# Patient Record
Sex: FEMALE | Race: WHITE | Employment: UNEMPLOYED | URBAN - METROPOLITAN AREA
[De-identification: names, ages, dates, MRNs, and addresses within clinical notes are randomized per-mention and may not be internally consistent; named-entity substitution may affect disease eponyms.]

---

## 2020-04-11 ENCOUNTER — HOSPITAL ENCOUNTER (EMERGENCY)
Age: 20
Discharge: HOME | End: 2020-04-11
Attending: EMERGENCY MEDICINE
Payer: COMMERCIAL

## 2020-04-11 VITALS
HEART RATE: 74 BPM | SYSTOLIC BLOOD PRESSURE: 126 MMHG | WEIGHT: 122 LBS | BODY MASS INDEX: 22.45 KG/M2 | TEMPERATURE: 99.8 F | DIASTOLIC BLOOD PRESSURE: 75 MMHG | OXYGEN SATURATION: 99 % | RESPIRATION RATE: 17 BRPM | HEIGHT: 62 IN

## 2020-04-11 LAB — HCG(URINE) PREGNANCY TEST: NEGATIVE

## 2020-04-11 PROCEDURE — 99283 EMERGENCY DEPT VISIT LOW MDM: CPT

## 2020-04-11 PROCEDURE — 84703 CHORIONIC GONADOTROPIN ASSAY: CPT

## 2020-04-11 RX ORDER — CITALOPRAM 10 MG/1
10 TABLET ORAL DAILY
Qty: 90 TABLET | Refills: 1 | Status: SHIPPED | OUTPATIENT
Start: 2020-04-11

## 2020-04-11 NOTE — ED PROVIDER NOTES
Emergency Department Encounter    Patient: Varinder Shafer  MRN: [id number]  : 2000  Date of Evaluation: 2020  ED Provider:  Violteta Rojo    Triage Chief Complaint:   Suicidal (states she has a lot going on at home, feels \"sad\" all the time. no specific plan but states she wants to hurt herself. )    [place]:  Varinder Shafer is a 20 y.o. female that presents for evaluation of increasing depression and anxiety, she states that she want some help she states that she is not actively suicidal homicidal she states she has no specific plan to harm herself, that triage note states this however she actually can completely denies this statement whatsoever. She has no hallucination she has had a history of depression in the past, she states she has multiple social stressors cannot get a job and has had multiple feelings of worthlessness is that she cannot do anything right. She is currently not on any medications. No fevers or Reiger's. No active chest pain. She was also complaining of a possible left breast mass. Her father  of cancer. No other family history for breast cancer or ovarian CA. Currently menstruating. Denies pregnancy. Last intercourse was within the last several days. ROS - see HPI, below listed is current ROS at time of my eval:  General:  No fevers  Eyes:  No recent vison changes  ENT:  No sore throat, no nasal congestion  Cardiovascular:  No chest pain, no palpitations  Respiratory:  No shortness of breath  Gastrointestinal:  No pain, no nausea, no vomiting, no diarrhea  Musculoskeletal:  No muscle pain  Skin:  No rash  Neurologic:  no headache  Psychiatric:  No anxiety, + depression  Genitourinary:  No dysuria  Endocrine:  No unexpected weight gain, no unexpected weight loss  Extremities:  no edema, no pain    History reviewed. No pertinent past medical history. History reviewed. No pertinent surgical history. History reviewed. No pertinent family history.   Social History

## 2021-12-27 ENCOUNTER — OFFICE VISIT (OUTPATIENT)
Dept: URGENT CARE | Age: 21
End: 2021-12-27
Payer: COMMERCIAL

## 2021-12-27 VITALS
BODY MASS INDEX: 25.95 KG/M2 | RESPIRATION RATE: 24 BRPM | TEMPERATURE: 98.6 F | DIASTOLIC BLOOD PRESSURE: 64 MMHG | HEART RATE: 80 BPM | HEIGHT: 62 IN | OXYGEN SATURATION: 98 % | SYSTOLIC BLOOD PRESSURE: 100 MMHG | WEIGHT: 141 LBS

## 2021-12-27 DIAGNOSIS — J02.0 STREP THROAT: ICD-10-CM

## 2021-12-27 DIAGNOSIS — J02.9 SORE THROAT: Primary | ICD-10-CM

## 2021-12-27 LAB — S PYO AG THROAT QL: POSITIVE

## 2021-12-27 PROCEDURE — G8427 DOCREV CUR MEDS BY ELIG CLIN: HCPCS

## 2021-12-27 PROCEDURE — 87880 STREP A ASSAY W/OPTIC: CPT

## 2021-12-27 PROCEDURE — 4004F PT TOBACCO SCREEN RCVD TLK: CPT

## 2021-12-27 PROCEDURE — 99202 OFFICE O/P NEW SF 15 MIN: CPT

## 2021-12-27 PROCEDURE — G8419 CALC BMI OUT NRM PARAM NOF/U: HCPCS

## 2021-12-27 PROCEDURE — G8484 FLU IMMUNIZE NO ADMIN: HCPCS

## 2021-12-27 RX ORDER — AZITHROMYCIN 250 MG/1
250 TABLET, FILM COATED ORAL DAILY
Qty: 6 TABLET | Refills: 0 | Status: SHIPPED | OUTPATIENT
Start: 2021-12-27 | End: 2022-01-01

## 2021-12-27 ASSESSMENT — ENCOUNTER SYMPTOMS
VOMITING: 0
CHEST TIGHTNESS: 0
NAUSEA: 0
RHINORRHEA: 0
SINUS PRESSURE: 0
SHORTNESS OF BREATH: 0
SINUS PAIN: 0
DIARRHEA: 0
SORE THROAT: 1

## 2021-12-27 NOTE — LETTER
[facility]  [facility] [address]  Phone: [phone]  Fax: [phone]    RAMIN Morales CNP        December 27, 2021     Patient: Siri Medina   YOB: 2000   Date of Visit: 12/27/2021       To Whom It May Concern: It is my medical opinion that Siri Medina may return to work on 12/29/21. If you have any questions or concerns, please don't hesitate to call.     Sincerely,        RAMIN Morales CNP

## 2021-12-28 NOTE — PATIENT INSTRUCTIONS
Strep swab in office today---positive  Strep throat instructions given. Take medications as prescribed. Discussed medication side effects. Obtain rest.  Drink fluids (water, tea, broth) but avoid alcohol, soft drinks, acidic items. Gargle daily with salt water (1/2 teaspoon in a glass of water). Avoid smoke and other irritants. Take OTC pain relievers as needed. Discussed Cepacol lozenges, Chloroseptic Spray, and hot tea with honey for symptom relief. Replace toothbrush and do not share utensils, cups, etc with others. Wash hands often with soap and water. Discussed precautions and indications for returning to clinic. Discussed precautions and indications for seeking ED care.

## 2021-12-28 NOTE — PROGRESS NOTES
Alena Dash (: 2000) is a 24 y.o. female here for evaluation of the following chief complaint(s):  Pharyngitis      SUBJECTIVE:  HPI  Pt presents today with c/o sore throat that began 3-4 days ago and was accompanied by a fever. She denies any exposure to covid and states tylenol has been helpful with her symptoms. Review of Systems   Constitutional: Positive for activity change, fatigue and fever. HENT: Positive for sore throat. Negative for congestion, ear pain, rhinorrhea, sinus pressure and sinus pain. Respiratory: Negative for chest tightness and shortness of breath. Cardiovascular: Negative for chest pain and palpitations. Gastrointestinal: Negative for diarrhea, nausea and vomiting. Musculoskeletal: Negative for arthralgias and myalgias. Neurological: Negative for dizziness and headaches. OBJECTIVE:  /64   Pulse 80   Temp 98.6 °F (37 °C)   Resp 24   Ht 5' 2\" (1.575 m)   Wt 141 lb (64 kg)   SpO2 98%   BMI 25.79 kg/m²    Physical Exam  Vitals reviewed. Constitutional:       Appearance: She is normal weight. She is ill-appearing. HENT:      Head: Normocephalic. Right Ear: Tympanic membrane normal.      Left Ear: Tympanic membrane normal.      Nose: Congestion present. Mouth/Throat:      Mouth: Mucous membranes are moist.      Pharynx: Oropharyngeal exudate and posterior oropharyngeal erythema present. Eyes:      Extraocular Movements: Extraocular movements intact. Conjunctiva/sclera: Conjunctivae normal.      Pupils: Pupils are equal, round, and reactive to light. Cardiovascular:      Rate and Rhythm: Normal rate and regular rhythm. Pulses: Normal pulses. Heart sounds: Normal heart sounds. Pulmonary:      Effort: Pulmonary effort is normal.      Breath sounds: Normal breath sounds. Abdominal:      General: Abdomen is flat. Bowel sounds are normal.   Musculoskeletal:         General: Normal range of motion.       Cervical back: Normal range of motion. Lymphadenopathy:      Cervical: Cervical adenopathy present. Skin:     General: Skin is warm and dry. Capillary Refill: Capillary refill takes less than 2 seconds. Neurological:      General: No focal deficit present. Mental Status: She is alert and oriented to person, place, and time. Mental status is at baseline. Psychiatric:         Mood and Affect: Mood normal.         Behavior: Behavior normal.         Thought Content: Thought content normal.         Judgment: Judgment normal.         ASSESSMENT:  1. Sore throat  -     POCT rapid strep A  2. Strep throat  -     azithromycin (ZITHROMAX) 250 MG tablet; Take 1 tablet by mouth daily for 5 days Take 2 tablets today and then take 1 tablet each day starting tomorrow until gone, Disp-6 tablet, R-0Normal      PLAN:  Strep swab in office today---positive  Pharyngitis/Strep throat instructions given. Take medications as prescribed. Discussed medication side effects. Obtain rest.  Drink fluids (water, tea, broth) but avoid alcohol, soft drinks, acidic items. Gargle daily with salt water (1/2 teaspoon in a glass of water). Avoid smoke and other irritants. Take OTC pain relievers as needed. Discussed Cepacol lozenges, Chloroseptic Spray, and hot tea with honey for symptom relief. Replace toothbrush and do not share utensils, cups, etc with others. Wash hands often with soap and water. Discussed precautions and indications for returning to clinic. Discussed precautions and indications for seeking ED care. Return if symptoms worsen or fail to improve.      Electronically signed by RAMIN Parr CNP on 12/27/2021 at 8:12 PM.

## 2022-01-11 ENCOUNTER — OFFICE VISIT (OUTPATIENT)
Dept: URGENT CARE | Age: 22
End: 2022-01-11
Payer: COMMERCIAL

## 2022-01-11 VITALS
BODY MASS INDEX: 22.29 KG/M2 | SYSTOLIC BLOOD PRESSURE: 110 MMHG | RESPIRATION RATE: 12 BRPM | OXYGEN SATURATION: 100 % | DIASTOLIC BLOOD PRESSURE: 70 MMHG | TEMPERATURE: 98.6 F | HEART RATE: 78 BPM | HEIGHT: 67 IN | WEIGHT: 142 LBS

## 2022-01-11 DIAGNOSIS — J02.9 PHARYNGITIS, UNSPECIFIED ETIOLOGY: ICD-10-CM

## 2022-01-11 DIAGNOSIS — B34.9 VIRAL ILLNESS: ICD-10-CM

## 2022-01-11 DIAGNOSIS — R50.81 FEVER IN OTHER DISEASES: Primary | ICD-10-CM

## 2022-01-11 LAB
Lab: NORMAL
PERFORMING INSTRUMENT: NORMAL
QC PASS/FAIL: NORMAL
SARS-COV-2, POC: NORMAL

## 2022-01-11 PROCEDURE — G8484 FLU IMMUNIZE NO ADMIN: HCPCS

## 2022-01-11 PROCEDURE — 87426 SARSCOV CORONAVIRUS AG IA: CPT

## 2022-01-11 PROCEDURE — 4004F PT TOBACCO SCREEN RCVD TLK: CPT

## 2022-01-11 PROCEDURE — G8420 CALC BMI NORM PARAMETERS: HCPCS

## 2022-01-11 PROCEDURE — 99212 OFFICE O/P EST SF 10 MIN: CPT

## 2022-01-11 PROCEDURE — G8427 DOCREV CUR MEDS BY ELIG CLIN: HCPCS

## 2022-01-11 ASSESSMENT — ENCOUNTER SYMPTOMS
SORE THROAT: 1
CHEST TIGHTNESS: 0
SINUS PRESSURE: 0
VOMITING: 0
SINUS PAIN: 0
DIARRHEA: 0
NAUSEA: 0
SHORTNESS OF BREATH: 0
COUGH: 0

## 2022-01-11 NOTE — LETTER
NOTIFICATION RETURN TO WORK / SCHOOL    1/11/2022    Ms. Stevie Givens  [address] [place] [address]      To Whom It May Concern:    Stevie Givens was tested for COVID-19 on 1/11/22, and the result was negative. She may return to work on 1/13 /22 as long as fever free and symptoms have improved. I recommend:return without restrictions    If there are questions or concerns, please have the patient contact our office.         Sincerely,      RAMIN Mendoza - CNP

## 2022-01-12 NOTE — PROGRESS NOTES
Amaris Acevedo (: 2000) is a 24 y.o. female here for evaluation of the following chief complaint(s):  Covid Testing      SUBJECTIVE:  HPI   Pt presents today with c/o sore throat, fatigue, congestion, and myalgias that began 3 days ago. She states OTC medications have been minimally helpful. She also states she had a recent positive exposure to covid. Review of Systems   Constitutional: Positive for fatigue. Negative for fever. HENT: Positive for sore throat. Negative for congestion, ear pain, sinus pressure and sinus pain. Respiratory: Negative for cough, chest tightness and shortness of breath. Cardiovascular: Negative for chest pain and palpitations. Gastrointestinal: Negative for diarrhea, nausea and vomiting. Musculoskeletal: Positive for myalgias. Negative for arthralgias. Neurological: Positive for headaches. OBJECTIVE:  /70   Pulse 78   Temp 98.6 °F (37 °C)   Resp 12   Ht 5' 7\" (1.702 m)   Wt 142 lb (64.4 kg)   LMP 2021   SpO2 100%   BMI 22.24 kg/m²    Physical Exam  Constitutional:       Appearance: Normal appearance. She is normal weight. HENT:      Right Ear: Tympanic membrane normal.      Left Ear: Tympanic membrane normal.      Nose: Congestion present. Mouth/Throat:      Mouth: Mucous membranes are dry. Pharynx: Oropharynx is clear. Posterior oropharyngeal erythema present. Eyes:      Conjunctiva/sclera: Conjunctivae normal.      Pupils: Pupils are equal, round, and reactive to light. Cardiovascular:      Rate and Rhythm: Normal rate and regular rhythm. Pulses: Normal pulses. Heart sounds: Normal heart sounds. Pulmonary:      Effort: Pulmonary effort is normal.   Abdominal:      General: Abdomen is flat. Bowel sounds are normal.      Palpations: Abdomen is soft. Musculoskeletal:         General: Normal range of motion. Cervical back: Normal range of motion and neck supple. Skin:     General: Skin is warm and dry. Capillary Refill: Capillary refill takes less than 2 seconds. Neurological:      General: No focal deficit present. Mental Status: She is alert and oriented to person, place, and time. Psychiatric:         Mood and Affect: Mood normal.         Behavior: Behavior normal.         Thought Content: Thought content normal.         Judgment: Judgment normal.         ASSESSMENT:  1. Fever in other diseases  -     POCT COVID-19, Antigen  2. Viral illness  3. Pharyngitis, unspecified etiology      PLAN:  COVID swab collected in office today---negative  Take medications as prescribed  Obtain rest.  Maintain hydration. Wash hands often with soap and water. Avoid smoke and other irritants. Wear face covering in public and follow social distancing recommendations. Discussed precautions and indications for returning to clinic. Discussed precautions and indications for seeking ED care. Return if symptoms worsen or fail to improve.      Electronically signed by RAMIN Reyes CNP on 1/11/2022 at 7:56 PM.

## 2022-01-12 NOTE — PATIENT INSTRUCTIONS
COVID swab collected in office today---negative  Patient declined prescribed medications. OTC medications to treat symptoms. Obtain rest.  Maintain hydration. Wash hands often with soap and water. Avoid smoke and other irritants. Wear face covering in public and follow social distancing recommendations. Discussed precautions and indications for returning to clinic. Discussed precautions and indications for seeking ED care. Patient Education        Viral Infections: Care Instructions  Overview     You don't feel well, but it's not clear what's causing it. You may have a viral infection. Viruses cause many illnesses, such as the common cold, influenza, fever, rashes, and the diarrhea, nausea, and vomiting that are symptoms of a stomach infection. You may wonder if antibiotic medicines could make you feel better. But antibiotics only treat infections caused by bacteria. They don't work on viruses. The good news is that viral infections usually aren't serious. Most will go away in a few days without medical treatment. In the meantime, there are a few things you can do to make yourself more comfortable. Follow-up care is a key part of your treatment and safety. Be sure to make and go to all appointments, and call your doctor if you are having problems. It's also a good idea to know your test results and keep a list of the medicines you take. How can you care for yourself at home? · Get plenty of rest if you feel tired. · Take an over-the-counter pain medicine if needed, such as acetaminophen (Tylenol), ibuprofen (Advil, Motrin), or naproxen (Aleve). Read and follow all instructions on the label. · Be careful when taking over-the-counter cold or flu medicines and Tylenol at the same time. Many of these medicines have acetaminophen, which is Tylenol. Read the labels to make sure that you are not taking more than the recommended dose. Too much acetaminophen (Tylenol) can be harmful.   · Drink plenty of fluids. If you have kidney, heart, or liver disease and have to limit fluids, talk with your doctor before you increase the amount of fluids you drink. · Stay home from work, school, and other public places while you have a fever. When should you call for help? Call 911 anytime you think you may need emergency care. For example, call if:    · You have severe trouble breathing.     · You passed out (lost consciousness). Call your doctor now or seek immediate medical care if:    · You seem to be getting much sicker.     · You have a new or higher fever.     · You have blood in your stools.     · You have new belly pain, or your pain gets worse.     · You have a new rash. Watch closely for changes in your health, and be sure to contact your doctor if:    · You start to get better and then get worse.     · You do not get better as expected. Where can you learn more? Go to https://Didi-Dache.[employer]. org and sign in to your [employer] account. Enter M131 in the [employer]house box to learn more about \"Viral Infections: Care Instructions. \"     If you do not have an account, please click on the \"Sign Up Now\" link. Current as of: July 1, 2021               Content Version: 13.1  © 2006-2021 [employer]. Care instructions adapted under license by [facility] ([facility]). If you have questions about a medical condition or this instruction, always ask your healthcare professional. [place] [address] any warranty or liability for your use of this information. Patient Education        Sore Throat: Care Instructions  Your Care Instructions     Infection by bacteria or a virus causes most sore throats. Cigarette smoke, dry air, air pollution, allergies, and yelling can also cause a sore throat. Sore throats can be painful and annoying. Fortunately, most sore throats go away on their own. If you have a bacterial infection, your doctor may prescribe antibiotics.   Follow-up care is a key part of your treatment and safety. Be sure to make and go to all appointments, and call your doctor if you are having problems. It's also a good idea to know your test results and keep a list of the medicines you take. How can you care for yourself at home? · If your doctor prescribed antibiotics, take them as directed. Do not stop taking them just because you feel better. You need to take the full course of antibiotics. · Gargle with warm salt water once an hour to help reduce swelling and relieve discomfort. Use 1 teaspoon of salt mixed in 1 cup of warm water. · Take an over-the-counter pain medicine, such as acetaminophen (Tylenol), ibuprofen (Advil, Motrin), or naproxen (Aleve). Read and follow all instructions on the label. · Be careful when taking over-the-counter cold or flu medicines and Tylenol at the same time. Many of these medicines have acetaminophen, which is Tylenol. Read the labels to make sure that you are not taking more than the recommended dose. Too much acetaminophen (Tylenol) can be harmful. · Drink plenty of fluids. Fluids may help soothe an irritated throat. Hot fluids, such as tea or soup, may help decrease throat pain. · Use over-the-counter throat lozenges to soothe pain. Regular cough drops or hard candy may also help. These should not be given to young children because of the risk of choking. · Do not smoke or allow others to smoke around you. If you need help quitting, talk to your doctor about stop-smoking programs and medicines. These can increase your chances of quitting for good. · Use a vaporizer or humidifier to add moisture to your bedroom. Follow the directions for cleaning the machine. When should you call for help? Call your doctor now or seek immediate medical care if:    · You have new or worse trouble swallowing.     · Your sore throat gets much worse on one side.    Watch closely for changes in your health, and be sure to contact your doctor if you do not get better as expected. Where can you learn more? Go to https://chpepiceweb.Infratel. org and sign in to your [employer] account. Enter T491 in the [facility] box to learn more about \"Sore Throat: Care Instructions. \"     If you do not have an account, please click on the \"Sign Up Now\" link. Current as of: September 8, 2021               Content Version: 13.1  © 2006-2021 Healthwise, Incorporated. Care instructions adapted under license by [facility] ([facility]). If you have questions about a medical condition or this instruction, always ask your healthcare professional. [address] any warranty or liability for your use of this information.

## 2022-02-02 ENCOUNTER — OFFICE VISIT (OUTPATIENT)
Dept: URGENT CARE | Age: 22
End: 2022-02-02
Payer: COMMERCIAL

## 2022-02-02 VITALS
HEIGHT: 62 IN | WEIGHT: 142 LBS | DIASTOLIC BLOOD PRESSURE: 59 MMHG | TEMPERATURE: 97.5 F | BODY MASS INDEX: 26.13 KG/M2 | SYSTOLIC BLOOD PRESSURE: 99 MMHG | OXYGEN SATURATION: 98 % | HEART RATE: 73 BPM

## 2022-02-02 DIAGNOSIS — T78.40XA ALLERGIC REACTION, INITIAL ENCOUNTER: Primary | ICD-10-CM

## 2022-02-02 DIAGNOSIS — L50.9 HIVES: ICD-10-CM

## 2022-02-02 PROCEDURE — 96372 THER/PROPH/DIAG INJ SC/IM: CPT | Performed by: NURSE PRACTITIONER

## 2022-02-02 PROCEDURE — 99202 OFFICE O/P NEW SF 15 MIN: CPT | Performed by: NURSE PRACTITIONER

## 2022-02-02 RX ORDER — DEXAMETHASONE SODIUM PHOSPHATE 4 MG/ML
8 INJECTION, SOLUTION INTRA-ARTICULAR; INTRALESIONAL; INTRAMUSCULAR; INTRAVENOUS; SOFT TISSUE ONCE
Status: COMPLETED | OUTPATIENT
Start: 2022-02-02 | End: 2022-02-02

## 2022-02-02 RX ORDER — DEXAMETHASONE SODIUM PHOSPHATE 4 MG/ML
4 INJECTION, SOLUTION INTRA-ARTICULAR; INTRALESIONAL; INTRAMUSCULAR; INTRAVENOUS; SOFT TISSUE ONCE
Status: DISCONTINUED | OUTPATIENT
Start: 2022-02-02 | End: 2022-02-02

## 2022-02-02 RX ADMIN — DEXAMETHASONE SODIUM PHOSPHATE 8 MG: 4 INJECTION, SOLUTION INTRA-ARTICULAR; INTRALESIONAL; INTRAMUSCULAR; INTRAVENOUS; SOFT TISSUE at 18:00

## 2022-02-02 ASSESSMENT — ENCOUNTER SYMPTOMS
ABDOMINAL PAIN: 0
SHORTNESS OF BREATH: 0
COLOR CHANGE: 0
FACIAL SWELLING: 0
BACK PAIN: 0
ABDOMINAL DISTENTION: 0
CONSTIPATION: 0
DIARRHEA: 0
COUGH: 0
WHEEZING: 0
SINUS PRESSURE: 0
NAUSEA: 0
CHEST TIGHTNESS: 0
SINUS PAIN: 0
VOMITING: 0

## 2022-02-02 NOTE — PROGRESS NOTES
Amanda Howard (:  2000) is a 24 y.o. female,New patient, here for evaluation of the following chief complaint(s):  Pruritis (Hands and feet ) and Rash         ASSESSMENT/PLAN:  1. Allergic reaction, initial encounter  -     dexamethasone (DECADRON) injection 8 mg; 8 mg, IntraMUSCular, ONCE, On 22 at 1815, For 1 dose  -     Ambulatory referral to Dermatology  2. Hives  -     dexamethasone (DECADRON) injection 8 mg; 8 mg, IntraMUSCular, ONCE, On 22 at 1815, For 1 dose  -     Ambulatory referral to Dermatology      Return if symptoms worsen or fail to improve, for FOLLOW UP WITH PCP. Subjective   SUBJECTIVE/OBJECTIVE:  Vitals:    22 1707   BP: (!) 99/59   Pulse: 73   Temp: 97.5 °F (36.4 °C)   SpO2: 98%       SARS-COV-2, POC (no units)   Date Value   2022 Not-Detected     Strep A Ag (no units)   Date Value   2021 Positive (A)       HPI  Patient came with complaints of an allergic reaction to , food. Reports the incident happened today after eating [employer] bread. a sandwich with nuts in it. The reaction happened after 30 minutes and felt itchy and red. In urgent care, noted a  mild rash, hives, and itching or stomach cramps. Denies swelling of her tongue and throat and denies SOB or chest pain. No wheezing. Angio edema, slurred speech, chills or dizziness. Review of Systems   Constitutional: Negative for appetite change, chills, fatigue, fever and unexpected weight change. HENT: Negative for congestion, ear pain, facial swelling, sinus pressure and sinus pain. Respiratory: Negative for cough, chest tightness, shortness of breath and wheezing. Cardiovascular: Negative for chest pain, palpitations and leg swelling. Gastrointestinal: Negative for abdominal distention, abdominal pain, constipation, diarrhea, nausea and vomiting. Genitourinary: Negative for decreased urine volume, difficulty urinating, dysuria, flank pain, frequency and urgency. Musculoskeletal: Negative for back pain, joint swelling, neck pain and neck stiffness. Skin: Positive for rash. Negative for color change. Hives after allergic reaction to eating [employer] sandwich. Allergic/Immunologic: Negative for environmental allergies and food allergies. Neurological: Negative for dizziness, syncope, weakness and headaches. Psychiatric/Behavioral: Negative for agitation and confusion. Objective     Vitals:    02/02/22 1707   BP: (!) 99/59   Pulse: 73   Temp: 97.5 °F (36.4 °C)   SpO2: 98%       Physical Exam  Vitals reviewed. Constitutional:       Appearance: Normal appearance. HENT:      Head: Normocephalic. Right Ear: Tympanic membrane and ear canal normal.      Left Ear: Tympanic membrane and ear canal normal.      Nose: No congestion or rhinorrhea. Mouth/Throat:      Mouth: Mucous membranes are dry. Pharynx: Oropharynx is clear. Eyes:      Pupils: Pupils are equal, round, and reactive to light. Cardiovascular:      Rate and Rhythm: Normal rate and regular rhythm. Pulmonary:      Effort: Pulmonary effort is normal. No respiratory distress. Breath sounds: Normal breath sounds. No stridor. No wheezing, rhonchi or rales. Chest:      Chest wall: No tenderness. Abdominal:      General: Bowel sounds are normal. There is no distension. Palpations: Abdomen is soft. Tenderness: There is no abdominal tenderness. Musculoskeletal:         General: No swelling or tenderness. Right lower leg: No edema. Left lower leg: No edema. Skin:     General: Skin is warm and dry. Coloration: Skin is not jaundiced or pale. Findings: No lesion or rash. Neurological:      Mental Status: She is alert and oriented to person, place, and time. Mental status is at baseline. Motor: No weakness.    Psychiatric:         Behavior: Behavior normal.              An electronic signature was used to authenticate this note.    --Yolande Winchester, APRN - CNP

## 2022-02-02 NOTE — PATIENT INSTRUCTIONS
are feeling better. Symptoms can come back after a shot. · Wear medical alert jewelry that lists your allergies. You can buy this at most drugstores. · If your child has a severe allergy, make sure that his or her teachers, babysitters, coaches, and other caregivers know about the allergy. They should have an epinephrine shot, know how and when to give it, and have a plan to take your child to the hospital.  When should you call for help? Give an epinephrine shot if:    · You think you are having a severe allergic reaction.     · You have symptoms in more than one body area, such as mild nausea and an itchy mouth. After giving an epinephrine shot call 911, even if you feel better. Call 911 if:    · You have symptoms of a severe allergic reaction. These may include:  ? Sudden raised, red areas (hives) all over your body. ? Swelling of the throat, mouth, lips, or tongue. ? Trouble breathing. ? Passing out (losing consciousness). Or you may feel very lightheaded or suddenly feel weak, confused, or restless.     · You have been given an epinephrine shot, even if you feel better. Call your doctor now or seek immediate medical care if:    · You have symptoms of an allergic reaction, such as:  ? A rash or hives (raised, red areas on the skin). ? Itching. ? Swelling. ? Belly pain, nausea, or vomiting. Watch closely for changes in your health, and be sure to contact your doctor if:    · You do not get better as expected. Where can you learn more? Go to https://[employer] Management.[employer]. org and sign in to your Workspace account. Enter N225 in the Vite box to learn more about \"Allergic Reaction: Care Instructions. \"     If you do not have an account, please click on the \"Sign Up Now\" link. Current as of: February 10, 2021               Content Version: 13.1  © 8910-8342 Healthwise, Incorporated. Care instructions adapted under license by [facility] ([facility]).  If you have questions about a medical condition or this instruction, always ask your healthcare professional. [place] [address] any warranty or liability for your use of this information.

## 2022-02-20 ENCOUNTER — OFFICE VISIT (OUTPATIENT)
Dept: URGENT CARE | Age: 22
End: 2022-02-20
Payer: COMMERCIAL

## 2022-02-20 VITALS
HEIGHT: 63 IN | OXYGEN SATURATION: 99 % | DIASTOLIC BLOOD PRESSURE: 71 MMHG | SYSTOLIC BLOOD PRESSURE: 102 MMHG | HEART RATE: 101 BPM | WEIGHT: 140 LBS | BODY MASS INDEX: 24.8 KG/M2 | TEMPERATURE: 98 F

## 2022-02-20 DIAGNOSIS — J02.9 PHARYNGITIS, UNSPECIFIED ETIOLOGY: ICD-10-CM

## 2022-02-20 DIAGNOSIS — J02.9 SORE THROAT: Primary | ICD-10-CM

## 2022-02-20 PROCEDURE — 99214 OFFICE O/P EST MOD 30 MIN: CPT | Performed by: NURSE PRACTITIONER

## 2022-02-20 PROCEDURE — 87880 STREP A ASSAY W/OPTIC: CPT | Performed by: NURSE PRACTITIONER

## 2022-02-20 RX ORDER — CLINDAMYCIN HYDROCHLORIDE 300 MG/1
300 CAPSULE ORAL 2 TIMES DAILY
Qty: 14 CAPSULE | Refills: 0 | Status: SHIPPED | OUTPATIENT
Start: 2022-02-20 | End: 2022-02-27

## 2022-02-20 NOTE — PATIENT INSTRUCTIONS
Patient Education        Sore Throat: Care Instructions  Your Care Instructions     Infection by bacteria or a virus causes most sore throats. Cigarette smoke, dry air, air pollution, allergies, and yelling can also cause a sore throat. Sore throats can be painful and annoying. Fortunately, most sore throats go away on their own. If you have a bacterial infection, your doctor may prescribe antibiotics. Follow-up care is a key part of your treatment and safety. Be sure to make and go to all appointments, and call your doctor if you are having problems. It's also a good idea to know your test results and keep a list of the medicines you take. How can you care for yourself at home? · If your doctor prescribed antibiotics, take them as directed. Do not stop taking them just because you feel better. You need to take the full course of antibiotics. · Gargle with warm salt water once an hour to help reduce swelling and relieve discomfort. Use 1 teaspoon of salt mixed in 1 cup of warm water. · Take an over-the-counter pain medicine, such as acetaminophen (Tylenol), ibuprofen (Advil, Motrin), or naproxen (Aleve). Read and follow all instructions on the label. · Be careful when taking over-the-counter cold or flu medicines and Tylenol at the same time. Many of these medicines have acetaminophen, which is Tylenol. Read the labels to make sure that you are not taking more than the recommended dose. Too much acetaminophen (Tylenol) can be harmful. · Drink plenty of fluids. Fluids may help soothe an irritated throat. Hot fluids, such as tea or soup, may help decrease throat pain. · Use over-the-counter throat lozenges to soothe pain. Regular cough drops or hard candy may also help. These should not be given to young children because of the risk of choking. · Do not smoke or allow others to smoke around you. If you need help quitting, talk to your doctor about stop-smoking programs and medicines.  These can increase your chances of quitting for good. · Use a vaporizer or humidifier to add moisture to your bedroom. Follow the directions for cleaning the machine. When should you call for help? Call your doctor now or seek immediate medical care if:    · You have new or worse trouble swallowing.     · Your sore throat gets much worse on one side. Watch closely for changes in your health, and be sure to contact your doctor if you do not get better as expected. Where can you learn more? Go to https://[employer].Sighter. org and sign in to your [employer] account. Enter K094 in the [employer] box to learn more about \"Sore Throat: Care Instructions. \"     If you do not have an account, please click on the \"Sign Up Now\" link. Current as of: September 8, 2021               Content Version: 13.1  © 5335-4989 Healthwise, Incorporated. Care instructions adapted under license by [facility] ([facility]). If you have questions about a medical condition or this instruction, always ask your healthcare professional. [address] any warranty or liability for your use of this information.

## 2022-02-21 LAB — S PYO AG THROAT QL: NORMAL

## 2022-02-21 NOTE — PROGRESS NOTES
Birgit Maxwell (:  2000) is a 24 y.o. female,Established patient, here for evaluation of the following chief complaint(s):  Pharyngitis         ASSESSMENT/PLAN:  1. Sore throat  -     POCT rapid strep A  2. Pharyngitis, unspecified etiology      Return if symptoms worsen or fail to improve, for FOLLOW UP WITH PCP. Subjective   SUBJECTIVE/OBJECTIVE:    SARS-COV-2, POC (no units)   Date Value   2022 Not-Detected     Strep A Ag (no units)   Date Value   2022 None Detected       HPI  Patient came with complaints of sore throat, and pharyngitis. Reports the symptoms started 2 days, enlarged inflamed tonsils with white pus pockets on tonsils In urgent care, noted pus pockets on tonsils and  Patient stated her throat is painful and irritated with swallowing. Denies swelling of her tongue and throat and denies SOB or chest pain. Objective   Results for POC orders placed in visit on 22   POCT rapid strep A   Result Value Ref Range    Strep A Ag None Detected None Detected       Vitals:    22 1632   BP: 102/71   Pulse: 101   Temp: 98 °F (36.7 °C)   SpO2: 99%       Physical Exam  Vitals reviewed. Constitutional:       Appearance: Normal appearance. HENT:      Head: Normocephalic. Right Ear: Ear canal normal.      Left Ear: Ear canal normal.      Nose: No congestion or rhinorrhea. Mouth/Throat:      Mouth: Mucous membranes are dry. Pharynx: Oropharynx is clear. Comments: Reports sore throat and irritated throat  Eyes:      Pupils: Pupils are equal, round, and reactive to light. Cardiovascular:      Rate and Rhythm: Normal rate and regular rhythm. Pulmonary:      Effort: Pulmonary effort is normal. No respiratory distress. Breath sounds: Normal breath sounds. No stridor. No wheezing, rhonchi or rales. Chest:      Chest wall: No tenderness. Abdominal:      General: Bowel sounds are normal. There is no distension. Palpations: Abdomen is soft. Tenderness: There is no abdominal tenderness. Musculoskeletal:         General: No swelling or tenderness. Right lower leg: No edema. Left lower leg: No edema. Skin:     General: Skin is warm and dry. Coloration: Skin is not jaundiced or pale. Findings: No lesion or rash. Neurological:      Mental Status: She is alert and oriented to person, place, and time. Mental status is at baseline. Motor: No weakness. Psychiatric:         Behavior: Behavior normal.             Infection by bacteria or a virus causes most sore throats. Cigarette smoke, dry air, air pollution, allergies, and yelling can also cause a sore throat. Sore throats can be painful and annoying. Fortunately, most sore throats go away on their own. If you have a bacterial infection, your doctor may prescribe antibiotics. Follow-up care is a key part of your treatment and safety. Be sure to make and go to all appointments, and call your doctor if you are having problems. It's also a good idea to know your test results and keep a list of the medicines you take. How can you care for yourself at home? · If your doctor prescribed antibiotics, take them as directed. Do not stop taking them just because you feel better. You need to take the full course of antibiotics. · Gargle with warm salt water once an hour to help reduce swelling and relieve discomfort. Use 1 teaspoon of salt mixed in 1 cup of warm water. · Take an over-the-counter pain medicine, such as acetaminophen (Tylenol), ibuprofen (Advil, Motrin), or naproxen (Aleve). Read and follow all instructions on the label. · Be careful when taking over-the-counter cold or flu medicines and Tylenol at the same time. Many of these medicines have acetaminophen, which is Tylenol. Read the labels to make sure that you are not taking more than the recommended dose. Too much acetaminophen (Tylenol) can be harmful. · Drink plenty of fluids.  Fluids may help soothe an irritated throat. Hot fluids, such as tea or soup, may help decrease throat pain. · Use over-the-counter throat lozenges to soothe pain. Regular cough drops or hard candy may also help. These should not be given to young children because of the risk of choking. · Do not smoke or allow others to smoke around you. If you need help quitting, talk to your doctor about stop-smoking programs and medicines. These can increase your chances of quitting for good. · Use a vaporizer or humidifier to add moisture to your bedroom. Follow the directions for cleaning the machine    An electronic signature was used to authenticate this note.     --Yolande Winchester, RAMIN - CNP

## 2023-06-05 ENCOUNTER — OFFICE VISIT (OUTPATIENT)
Dept: URGENT CARE | Age: 23
End: 2023-06-05

## 2023-06-05 VITALS
HEIGHT: 62 IN | RESPIRATION RATE: 14 BRPM | OXYGEN SATURATION: 100 % | BODY MASS INDEX: 25.61 KG/M2 | SYSTOLIC BLOOD PRESSURE: 105 MMHG | DIASTOLIC BLOOD PRESSURE: 69 MMHG | HEART RATE: 71 BPM | TEMPERATURE: 97.4 F

## 2023-06-05 DIAGNOSIS — N30.00 ACUTE CYSTITIS WITHOUT HEMATURIA: Primary | ICD-10-CM

## 2023-06-05 DIAGNOSIS — R51.9 INTRACTABLE HEADACHE, UNSPECIFIED CHRONICITY PATTERN, UNSPECIFIED HEADACHE TYPE: ICD-10-CM

## 2023-06-05 LAB
BILIRUBIN, POC: NORMAL
BLOOD URINE, POC: NORMAL
CLARITY, POC: NORMAL
COLOR, POC: YELLOW
CONTROL: NORMAL
GLUCOSE URINE, POC: NORMAL
KETONES, POC: NORMAL
LEUKOCYTE EST, POC: NORMAL
NITRITE, POC: NEGATIVE
PH, POC: 6
PREGNANCY TEST URINE, POC: NEGATIVE
PROTEIN, POC: NORMAL
SPECIFIC GRAVITY, POC: 1.02
SPECIMEN TYPE: NORMAL
TRICHOMONAS VAGINALIS SCREEN: NEGATIVE
UROBILINOGEN, POC: 0.2

## 2023-06-05 RX ORDER — PHENAZOPYRIDINE HYDROCHLORIDE 200 MG/1
200 TABLET, FILM COATED ORAL 3 TIMES DAILY PRN
Qty: 6 TABLET | Refills: 0 | Status: SHIPPED | OUTPATIENT
Start: 2023-06-05 | End: 2023-06-07

## 2023-06-05 RX ORDER — SULFAMETHOXAZOLE AND TRIMETHOPRIM 800; 160 MG/1; MG/1
1 TABLET ORAL 2 TIMES DAILY
Qty: 10 TABLET | Refills: 0 | Status: SHIPPED | OUTPATIENT
Start: 2023-06-05 | End: 2023-06-10

## 2023-06-05 ASSESSMENT — ENCOUNTER SYMPTOMS: COUGH: 1

## 2023-06-05 NOTE — PATIENT INSTRUCTIONS
UA in clinic today shows leukocytes and blood  Urine culture and STI panel sent out. Will call patient with results. Urine pregnancy negative in clinic today  Complete antibiotics as prescribed. An over the counter probiotic is also recommended. Pyridium as prescribed   Can use maximum of ibuprofen 800mg every 6 hours as needed for headache OR naproxen 250mg every 12 hours as needed for headache  Establish care with PCP. Referral placed  ER evaluation if symptoms persist or if symptoms worsen.   New Prescriptions    PHENAZOPYRIDINE (PYRIDIUM) 200 MG TABLET    Take 1 tablet by mouth 3 times daily as needed for Pain    SULFAMETHOXAZOLE-TRIMETHOPRIM (BACTRIM DS;SEPTRA DS) 800-160 MG PER TABLET    Take 1 tablet by mouth 2 times daily for 5 days

## 2023-06-05 NOTE — PROGRESS NOTES
effort is normal.      Breath sounds: Normal breath sounds. Abdominal:      Tenderness: There is no right CVA tenderness or left CVA tenderness. Neurological:      Mental Status: She is alert and oriented to person, place, and time. Psychiatric:         Mood and Affect: Mood is anxious. Speech: Speech normal.      Comments: Distracted          An electronic signature was used to authenticate this note.     --Marcia Santillan, RAMIN - CNP

## 2023-06-07 LAB
BACTERIA UR CULT: ABNORMAL
BACTERIA UR CULT: ABNORMAL
ORGANISM: ABNORMAL

## 2023-06-09 ENCOUNTER — TELEPHONE (OUTPATIENT)
Dept: URGENT CARE | Age: 23
End: 2023-06-09

## 2023-06-09 LAB
C TRACH DNA UR QL NAA+PROBE: NEGATIVE
N GONORRHOEA DNA UR QL NAA+PROBE: NEGATIVE

## 2023-06-09 NOTE — TELEPHONE ENCOUNTER
Patient called, left vm to return call. Please let patient know that her urine culture came back positive for staph infection in the urine. The antibiotics she was prescribed at [facility] visit should resolve infection. Her trichomoniasis,testing was negative. Still waiting on Mycoplasma, Gonorrhea and Chlamydia results, we will call when received. Please follow up with PCP if urinary symptoms have not resolved.

## 2023-06-11 LAB — PRELIMINARY: NORMAL

## 2023-08-31 ENCOUNTER — OFFICE VISIT (OUTPATIENT)
Dept: URGENT CARE | Age: 23
End: 2023-08-31

## 2023-08-31 VITALS
TEMPERATURE: 98.4 F | OXYGEN SATURATION: 99 % | HEIGHT: 62 IN | SYSTOLIC BLOOD PRESSURE: 101 MMHG | WEIGHT: 114 LBS | RESPIRATION RATE: 16 BRPM | DIASTOLIC BLOOD PRESSURE: 65 MMHG | BODY MASS INDEX: 20.98 KG/M2 | HEART RATE: 63 BPM

## 2023-08-31 DIAGNOSIS — N30.00 ACUTE CYSTITIS WITHOUT HEMATURIA: ICD-10-CM

## 2023-08-31 DIAGNOSIS — Z20.2 EXPOSURE TO SEXUALLY TRANSMITTED DISEASE (STD): Primary | ICD-10-CM

## 2023-08-31 LAB
BILIRUBIN, POC: NEGATIVE
BLOOD URINE, POC: NEGATIVE
CLARITY, POC: CLEAR
COLOR, POC: YELLOW
CONTROL: POSITIVE
GLUCOSE URINE, POC: NEGATIVE
KETONES, POC: NEGATIVE
LEUKOCYTE EST, POC: ABNORMAL
NITRITE, POC: NEGATIVE
PH, POC: 7
PREGNANCY TEST URINE, POC: NEGATIVE
PROTEIN, POC: NEGATIVE
SPECIFIC GRAVITY, POC: 1.02
UROBILINOGEN, POC: ABNORMAL

## 2023-08-31 RX ORDER — NITROFURANTOIN 25; 75 MG/1; MG/1
100 CAPSULE ORAL 2 TIMES DAILY
Qty: 10 CAPSULE | Refills: 0 | Status: SHIPPED | OUTPATIENT
Start: 2023-08-31 | End: 2023-09-05

## 2023-08-31 ASSESSMENT — ENCOUNTER SYMPTOMS
SORE THROAT: 0
COUGH: 0
SHORTNESS OF BREATH: 0

## 2023-08-31 NOTE — PATIENT INSTRUCTIONS
Urine in clinic today shows leukocytes  Urine pregnancy today in clinic today is negative   Urine culture, urine for trichomonas, chlamydia, and gonorrhea, and swab for BV/candida sent out. Will call with results. Complete antibiotics for UTI as prescribed. An over the counter probiotic is also recommended. Please establish care with primary care provider   ER evaluation  if symptoms persist or if symptoms worsen.   New Prescriptions    NITROFURANTOIN, MACROCRYSTAL-MONOHYDRATE, (MACROBID) 100 MG CAPSULE    Take 1 capsule by mouth 2 times daily for 5 days

## 2023-09-01 ENCOUNTER — TELEPHONE (OUTPATIENT)
Dept: URGENT CARE | Age: 23
End: 2023-09-01

## 2023-09-01 LAB
BACTERIA UR CULT: NORMAL
C TRACH DNA UR QL NAA+PROBE: NEGATIVE
CANDIDA DNA VAG QL NAA+PROBE: ABNORMAL
G VAGINALIS DNA SPEC QL NAA+PROBE: ABNORMAL
N GONORRHOEA DNA UR QL NAA+PROBE: NEGATIVE
SPECIMEN TYPE: ABNORMAL
T VAGINALIS DNA VAG QL NAA+PROBE: ABNORMAL
TRICHOMONAS VAGINALIS SCREEN: POSITIVE

## 2023-09-01 RX ORDER — METRONIDAZOLE 500 MG/1
500 TABLET ORAL 2 TIMES DAILY
Qty: 14 TABLET | Refills: 0 | Status: SHIPPED | OUTPATIENT
Start: 2023-09-01 | End: 2023-09-08

## 2023-09-01 NOTE — TELEPHONE ENCOUNTER
Call was placed to the patient. Left VM for patient to return call. Patient is positive for trichomonas. She needs to speak to a provider. Will recommend:   -metronidazole 500mg po BID x7 days   -retest with primary care or OB-GYN at least 3 weeks after treatment is complete  -treatment of any sexual partners  -abstaining from sexual contact until treatment is complete    Please call patient again on 9/2/2023.

## 2023-09-01 NOTE — TELEPHONE ENCOUNTER
The previous message was given to the patient. She was also given the result of positive BV. Metronidazole was prescribed and instructions from abstaining from alcohol were given. Follow up instructions were given.

## 2023-09-02 NOTE — RESULT ENCOUNTER NOTE
Called and confirmed  and name. Gave negative urine culture results and the positive BV. Pt was informed the same medication that treats her known Trichomonas infection will treat the BV as well. She will be getting the Flagyl today to start. Follow up with OB/GYN if any  symptoms are still present after completion of the antibiotics and refrain from intercourse for 10 days. Pt agreed.

## 2025-06-30 ENCOUNTER — HOSPITAL ENCOUNTER (EMERGENCY)
Age: 25
Discharge: ANOTHER HOSPITAL | End: 2025-07-01
Attending: EMERGENCY MEDICINE

## 2025-06-30 VITALS
RESPIRATION RATE: 16 BRPM | BODY MASS INDEX: 25.06 KG/M2 | SYSTOLIC BLOOD PRESSURE: 112 MMHG | TEMPERATURE: 98.1 F | HEART RATE: 88 BPM | HEIGHT: 62 IN | DIASTOLIC BLOOD PRESSURE: 79 MMHG | OXYGEN SATURATION: 97 % | WEIGHT: 136.2 LBS

## 2025-06-30 DIAGNOSIS — F32.9 MAJOR DEPRESSIVE DISORDER, REMISSION STATUS UNSPECIFIED, UNSPECIFIED WHETHER RECURRENT: ICD-10-CM

## 2025-06-30 DIAGNOSIS — T39.1X2A INTENTIONAL ACETAMINOPHEN OVERDOSE, INITIAL ENCOUNTER (HCC): Primary | ICD-10-CM

## 2025-06-30 LAB
ALBUMIN SERPL-MCNC: 4.7 G/DL (ref 3.4–5)
ALBUMIN/GLOB SERPL: 1.8 {RATIO}
ALP SERPL-CCNC: 94 U/L (ref 40–129)
ALT SERPL-CCNC: 19 U/L (ref 10–40)
AMPHET UR QL SCN: NEGATIVE
ANION GAP SERPL CALCULATED.3IONS-SCNC: 19 MMOL/L (ref 3–16)
APAP SERPL-MCNC: 14 UG/ML (ref 10–30)
APAP SERPL-MCNC: 30 UG/ML (ref 10–30)
AST SERPL-CCNC: 24 U/L (ref 15–37)
BARBITURATES UR QL SCN: NEGATIVE
BASOPHILS # BLD: 0.06 K/UL (ref 0–0.2)
BASOPHILS NFR BLD: 1 %
BENZODIAZ UR QL: NEGATIVE
BILIRUB SERPL-MCNC: 0.3 MG/DL (ref 0–1)
BUN SERPL-MCNC: 8 MG/DL (ref 7–20)
CALCIUM SERPL-MCNC: 9.2 MG/DL (ref 8.3–10.6)
CANNABINOIDS UR QL SCN: NEGATIVE
CHLORIDE SERPL-SCNC: 107 MMOL/L (ref 99–110)
CO2 SERPL-SCNC: 16 MMOL/L (ref 21–32)
COCAINE UR QL SCN: NEGATIVE
CREAT SERPL-MCNC: 0.7 MG/DL (ref 0.5–1)
EOSINOPHIL # BLD: 0.47 K/UL (ref 0–0.6)
EOSINOPHILS RELATIVE PERCENT: 4 %
ERYTHROCYTE [DISTWIDTH] IN BLOOD BY AUTOMATED COUNT: 12.5 % (ref 12.4–15.4)
ETHANOLAMINE SERPL-MCNC: 110 MG/DL (ref 0–0.08)
FENTANYL UR QL: NEGATIVE
GFR, ESTIMATED: >90 ML/MIN/1.73M2
GLUCOSE SERPL-MCNC: 137 MG/DL (ref 70–99)
HCG SERPL QL: NEGATIVE
HCT VFR BLD AUTO: 39 % (ref 36–48)
HGB BLD-MCNC: 12.8 G/DL (ref 12–16)
IMM GRANULOCYTES # BLD AUTO: 0.01 K/UL (ref 0–0.5)
IMM GRANULOCYTES NFR BLD: 0 %
LYMPHOCYTES NFR BLD: 3.02 K/UL (ref 1–5.1)
LYMPHOCYTES RELATIVE PERCENT: 27 %
MCH RBC QN AUTO: 29.8 PG (ref 26–34)
MCHC RBC AUTO-ENTMCNC: 32.8 G/DL (ref 31–36)
MCV RBC AUTO: 90.7 FL (ref 80–100)
METHADONE UR QL: NEGATIVE
MONOCYTES NFR BLD: 0.85 K/UL (ref 0–1.3)
MONOCYTES NFR BLD: 8 %
NEUTROPHILS NFR BLD: 61 %
NEUTS SEG NFR BLD: 6.96 K/UL (ref 1.7–7.7)
OPIATES UR QL SCN: NEGATIVE
OXYCODONE UR QL SCN: NEGATIVE
PCP UR QL SCN: NEGATIVE
PH UR STRIP: 6 [PH]
PLATELET # BLD AUTO: 402 K/UL (ref 135–450)
PMV BLD AUTO: 9.4 FL
POTASSIUM SERPL-SCNC: 3.9 MMOL/L (ref 3.5–5.1)
PROT SERPL-MCNC: 7.3 G/DL (ref 6.4–8.2)
RBC # BLD AUTO: 4.3 M/UL (ref 4–5.2)
SALICYLATES SERPL-MCNC: <0.5 MG/DL (ref 15–30)
SARS-COV-2 RDRP RESP QL NAA+PROBE: NOT DETECTED
SODIUM SERPL-SCNC: 141 MMOL/L (ref 136–145)
SPECIMEN DESCRIPTION: NORMAL
TEST INFORMATION: NORMAL
WBC OTHER # BLD: 11.4 K/UL (ref 4–11)

## 2025-06-30 PROCEDURE — 6370000000 HC RX 637 (ALT 250 FOR IP): Performed by: EMERGENCY MEDICINE

## 2025-06-30 PROCEDURE — G0480 DRUG TEST DEF 1-7 CLASSES: HCPCS

## 2025-06-30 PROCEDURE — 80053 COMPREHEN METABOLIC PANEL: CPT

## 2025-06-30 PROCEDURE — 93005 ELECTROCARDIOGRAM TRACING: CPT

## 2025-06-30 PROCEDURE — 99291 CRITICAL CARE FIRST HOUR: CPT

## 2025-06-30 PROCEDURE — 87635 SARS-COV-2 COVID-19 AMP PRB: CPT

## 2025-06-30 PROCEDURE — 80307 DRUG TEST PRSMV CHEM ANLYZR: CPT

## 2025-06-30 PROCEDURE — 85025 COMPLETE CBC W/AUTO DIFF WBC: CPT

## 2025-06-30 PROCEDURE — 80143 DRUG ASSAY ACETAMINOPHEN: CPT

## 2025-06-30 PROCEDURE — 80179 DRUG ASSAY SALICYLATE: CPT

## 2025-06-30 PROCEDURE — 84703 CHORIONIC GONADOTROPIN ASSAY: CPT

## 2025-06-30 RX ADMIN — POISON ADSORBENT 50 G: 50 SUSPENSION ORAL at 16:09

## 2025-06-30 ASSESSMENT — LIFESTYLE VARIABLES
HOW OFTEN DO YOU HAVE A DRINK CONTAINING ALCOHOL: 4 OR MORE TIMES A WEEK
HOW MANY STANDARD DRINKS CONTAINING ALCOHOL DO YOU HAVE ON A TYPICAL DAY: 5 OR 6

## 2025-06-30 ASSESSMENT — PAIN - FUNCTIONAL ASSESSMENT: PAIN_FUNCTIONAL_ASSESSMENT: NONE - DENIES PAIN
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
## 2025-06-30 NOTE — ED PROVIDER NOTES
[facility] EMERGENCY DEPARTMENT  EMERGENCY DEPARTMENTENCOUNTER      Pt Name: Ritesh Giang  MRN: [id number]  Birthdate 2000  Date ofevaluation: 2025  Provider: Brian Curiel MD    CHIEF COMPLAINT       Chief Complaint   Patient presents with    Mental Health Problem       HPI    HISTORY OF PRESENT ILLNESS   (Location/Symptom, Timing/Onset,Context/Setting, Quality, Duration, Modifying Factors, Severity)  Note limiting factors.   Ritesh Giang is a 24 y.o. female who presents to the emergency department after taking approximately 12 Tylenol tablets.  This is a 24-year-old female who states that around an hour prior to arrival she took 12 Tylenol tablets in an attempt to hurt herself.  The patient apparently called 911 and she also apparently posted this on social media.  The police actually brought the patient in on a psychiatric emergency hold.  She denies any nausea or vomiting.  She denies any attempts in the past to hurt herself.  The patient states she is depressed.        NursingNotes were reviewed.    Review of Systems    REVIEW OF SYSTEMS    (2-9 systems for level 4, 10 or more for level 5)     Review of Systems   Constitutional: Negative for fever.   HENT: Negative for rhinorrhea and sore throat.    Eyes: Negative for redness.   Respiratory: Negative for shortness of breath.    Cardiovascular: Negative for chest pain.   Gastrointestinal: Negative for abdominal pain.   Genitourinary: Negative for flank pain.   Neurological: Negative for headaches.   Hematological: Negative for adenopathy.   Psychiatric/Behavioral: Negative for confusion.              Except as noted above the remainder of the review of systems was reviewed and negative.       PAST MEDICAL HISTORY   History reviewed. No pertinent past medical history.      SURGICALHISTORY       Past Surgical History:   Procedure Laterality Date    THERAPEUTIC            CURRENT MEDICATIONS       Previous Medications    No

## 2025-06-30 NOTE — ED NOTES
Pt presents to the ED via EMS for suicidal attempt. Pt ambulated to exam room under own power. Pt reports she attempted to overdose on tylenol. EMS reports pt took 10-12 500mg tylenol along with alcohol. Pt denies taking any prescribed medication. Pt changed into safety gown and personal belongings collected and given to security. Suicide precautions implemented at this time.

## 2025-06-30 NOTE — ED PROVIDER NOTES
I assumed care of patient at 1805 from Dr. Curiel.  Please see his full history and physical examination for full details regarding this encounter.  In short patient ingested approximately 12 Tylenol tablets 1 hour prior to admission.  She received activated charcoal after discussion with poison control.  She tolerated activated charcoal well.  Both 1 and 4-hour acetaminophen levels were normal.  Patient does not meet criteria for NAC.  She has been medically cleared.  She rested comfortably throughout her ED stay.    Alert awake oriented x 3 resting comfortably  Heart rate rhythm regular, lungs clear to auscultation, abdomen soft nontender positive bowel sounds no mass or guarding.      Once medically cleared patient was evaluated by telepsych.  All are in agreement that patient meets criteria for inpatient stabilization and treatment.    1930-PT IS MEDICALLY CLEARED    2115-patient accepted to [facility] by Dr. James Epley psychiatrist.    Patient rested comfortably throughout her ED stay she was calm and cooperative.  She had no progression of symptoms.    Final impression:  1.  Intentional acetaminophen overdose, initial encounter  2.  Major depression, remission status unspecified, unspecified whether recurrent        Disposition: Transfer to [facility].     Karen Epperson, DO  07/03/25 1037

## 2025-07-01 ENCOUNTER — HOSPITAL ENCOUNTER (INPATIENT)
Age: 25
LOS: 1 days | Discharge: HOME | DRG: 918 | End: 2025-07-02
Attending: PSYCHIATRY & NEUROLOGY | Admitting: PSYCHIATRY & NEUROLOGY

## 2025-07-01 PROBLEM — F10.90 ALCOHOL USE: Status: ACTIVE | Noted: 2025-07-01

## 2025-07-01 PROBLEM — F32.A DEPRESSION, UNSPECIFIED: Status: ACTIVE | Noted: 2025-07-01

## 2025-07-01 LAB
ALBUMIN SERPL-MCNC: 4 G/DL (ref 3.4–5)
ALBUMIN/GLOB SERPL: 1.4 {RATIO} (ref 1.1–2.2)
ALP SERPL-CCNC: 92 U/L (ref 40–129)
ALT SERPL-CCNC: 16 U/L (ref 10–40)
ANION GAP SERPL CALCULATED.3IONS-SCNC: 13 MMOL/L (ref 3–16)
AST SERPL-CCNC: 15 U/L (ref 15–37)
BASOPHILS NFR BLD: 0.8 %
BILIRUB SERPL-MCNC: 0.4 MG/DL (ref 0–1)
BUN SERPL-MCNC: 8 MG/DL (ref 7–20)
CALCIUM SERPL-MCNC: 9.4 MG/DL (ref 8.3–10.6)
CHLORIDE SERPL-SCNC: 105 MMOL/L (ref 99–110)
CO2 SERPL-SCNC: 22 MMOL/L (ref 21–32)
CREAT SERPL-MCNC: 0.6 MG/DL (ref 0.6–1.1)
DEPRECATED RDW RBC AUTO: 13.3 % (ref 12.4–15.4)
EKG ATRIAL RATE: 84 BPM
EKG DIAGNOSIS: NORMAL
EKG P AXIS: 63 DEGREES
EKG P-R INTERVAL: 148 MS
EKG Q-T INTERVAL: 360 MS
EKG QRS DURATION: 78 MS
EKG QTC CALCULATION (BAZETT): 425 MS
EKG R AXIS: 60 DEGREES
EKG T AXIS: 54 DEGREES
EKG VENTRICULAR RATE: 84 BPM
EOSINOPHIL # BLD: 0.4 K/UL (ref 0–0.6)
GFR SERPLBLD CREATININE-BSD FMLA CKD-EPI: >90 ML/MIN/{1.73_M2}
GLUCOSE SERPL-MCNC: 96 MG/DL (ref 70–99)
HCT VFR BLD AUTO: 38.6 % (ref 36–48)
HGB BLD-MCNC: 12.9 G/DL (ref 12–16)
LYMPHOCYTES # BLD: 2.5 K/UL (ref 1–5.1)
LYMPHOCYTES NFR BLD: 39 %
MCH RBC QN AUTO: 30.2 PG (ref 26–34)
MCHC RBC AUTO-ENTMCNC: 33.5 G/DL (ref 31–36)
MCV RBC AUTO: 89.9 FL (ref 80–100)
MONOCYTES # BLD: 0.5 K/UL (ref 0–1.3)
NEUTROPHILS # BLD: 3 K/UL (ref 1.7–7.7)
NEUTROPHILS NFR BLD: 46.4 %
PLATELET # BLD AUTO: 349 K/UL (ref 135–450)
POTASSIUM SERPL-SCNC: 4.1 MMOL/L (ref 3.5–5.1)
PROT SERPL-MCNC: 6.8 G/DL (ref 6.4–8.2)
RBC # BLD AUTO: 4.29 M/UL (ref 4–5.2)
SODIUM SERPL-SCNC: 140 MMOL/L (ref 136–145)
TSH SERPL DL<=0.005 MIU/L-ACNC: 3.69 UIU/ML (ref 0.27–4.2)
WBC # BLD AUTO: 6.4 K/UL (ref 4–11)

## 2025-07-01 PROCEDURE — 36415 COLL VENOUS BLD VENIPUNCTURE: CPT

## 2025-07-01 PROCEDURE — 1240000000 HC EMOTIONAL WELLNESS R&B

## 2025-07-01 PROCEDURE — 6370000000 HC RX 637 (ALT 250 FOR IP)

## 2025-07-01 PROCEDURE — 6370000000 HC RX 637 (ALT 250 FOR IP): Performed by: PSYCHIATRY & NEUROLOGY

## 2025-07-01 PROCEDURE — 80053 COMPREHEN METABOLIC PANEL: CPT

## 2025-07-01 PROCEDURE — 83036 HEMOGLOBIN GLYCOSYLATED A1C: CPT

## 2025-07-01 PROCEDURE — 84443 ASSAY THYROID STIM HORMONE: CPT

## 2025-07-01 PROCEDURE — 99221 1ST HOSP IP/OBS SF/LOW 40: CPT

## 2025-07-01 PROCEDURE — 85025 COMPLETE CBC W/AUTO DIFF WBC: CPT

## 2025-07-01 PROCEDURE — 80061 LIPID PANEL: CPT

## 2025-07-01 RX ORDER — IBUPROFEN 400 MG/1
400 TABLET, FILM COATED ORAL EVERY 6 HOURS PRN
Status: DISCONTINUED | OUTPATIENT
Start: 2025-07-01 | End: 2025-07-01

## 2025-07-01 RX ORDER — MULTIVITAMIN WITH IRON
1 TABLET ORAL DAILY
Status: DISCONTINUED | OUTPATIENT
Start: 2025-07-01 | End: 2025-07-02 | Stop reason: HOSPADM

## 2025-07-01 RX ORDER — LORAZEPAM 2 MG/1
4 TABLET ORAL
Status: DISCONTINUED | OUTPATIENT
Start: 2025-07-01 | End: 2025-07-01

## 2025-07-01 RX ORDER — ESCITALOPRAM OXALATE 10 MG/1
5 TABLET ORAL DAILY
Status: COMPLETED | OUTPATIENT
Start: 2025-07-01 | End: 2025-07-01

## 2025-07-01 RX ORDER — HYDROXYZINE HYDROCHLORIDE 50 MG/1
50 TABLET, FILM COATED ORAL 3 TIMES DAILY PRN
Status: DISCONTINUED | OUTPATIENT
Start: 2025-07-01 | End: 2025-07-02 | Stop reason: HOSPADM

## 2025-07-01 RX ORDER — GUAIFENESIN/DEXTROMETHORPHAN 100-10MG/5
5 SYRUP ORAL EVERY 4 HOURS PRN
Status: DISCONTINUED | OUTPATIENT
Start: 2025-07-01 | End: 2025-07-02 | Stop reason: HOSPADM

## 2025-07-01 RX ORDER — DIPHENHYDRAMINE HYDROCHLORIDE 50 MG/ML
50 INJECTION, SOLUTION INTRAMUSCULAR; INTRAVENOUS EVERY 4 HOURS PRN
Status: DISCONTINUED | OUTPATIENT
Start: 2025-07-01 | End: 2025-07-01

## 2025-07-01 RX ORDER — TRAZODONE HYDROCHLORIDE 50 MG/1
50 TABLET ORAL NIGHTLY PRN
Status: DISCONTINUED | OUTPATIENT
Start: 2025-07-01 | End: 2025-07-02 | Stop reason: HOSPADM

## 2025-07-01 RX ORDER — ACETAMINOPHEN 325 MG/1
650 TABLET ORAL EVERY 8 HOURS PRN
Status: DISCONTINUED | OUTPATIENT
Start: 2025-07-01 | End: 2025-07-02 | Stop reason: HOSPADM

## 2025-07-01 RX ORDER — LORAZEPAM 2 MG/1
2 TABLET ORAL
Status: DISCONTINUED | OUTPATIENT
Start: 2025-07-01 | End: 2025-07-01

## 2025-07-01 RX ORDER — POLYETHYLENE GLYCOL 3350 17 G
2 POWDER IN PACKET (EA) ORAL
Status: DISCONTINUED | OUTPATIENT
Start: 2025-07-01 | End: 2025-07-02 | Stop reason: HOSPADM

## 2025-07-01 RX ORDER — LORAZEPAM 1 MG/1
1 TABLET ORAL
Status: DISCONTINUED | OUTPATIENT
Start: 2025-07-01 | End: 2025-07-01

## 2025-07-01 RX ORDER — ACETAMINOPHEN 325 MG/1
650 TABLET ORAL EVERY 4 HOURS PRN
Status: DISCONTINUED | OUTPATIENT
Start: 2025-07-01 | End: 2025-07-01

## 2025-07-01 RX ORDER — ESCITALOPRAM OXALATE 10 MG/1
10 TABLET ORAL DAILY
Status: DISCONTINUED | OUTPATIENT
Start: 2025-07-02 | End: 2025-07-02 | Stop reason: HOSPADM

## 2025-07-01 RX ORDER — MAGNESIUM HYDROXIDE/ALUMINUM HYDROXICE/SIMETHICONE 120; 1200; 1200 MG/30ML; MG/30ML; MG/30ML
30 SUSPENSION ORAL EVERY 6 HOURS PRN
Status: DISCONTINUED | OUTPATIENT
Start: 2025-07-01 | End: 2025-07-02 | Stop reason: HOSPADM

## 2025-07-01 RX ORDER — LANOLIN ALCOHOL/MO/W.PET/CERES
100 CREAM (GRAM) TOPICAL DAILY
Status: DISCONTINUED | OUTPATIENT
Start: 2025-07-01 | End: 2025-07-02 | Stop reason: HOSPADM

## 2025-07-01 RX ORDER — OLANZAPINE 5 MG/1
5 TABLET, FILM COATED ORAL EVERY 4 HOURS PRN
Status: DISCONTINUED | OUTPATIENT
Start: 2025-07-01 | End: 2025-07-01

## 2025-07-01 RX ADMIN — ESCITALOPRAM OXALATE 5 MG: 10 TABLET ORAL at 12:31

## 2025-07-01 RX ADMIN — Medication 100 MG: at 09:25

## 2025-07-01 RX ADMIN — TRAZODONE HYDROCHLORIDE 50 MG: 50 TABLET ORAL at 20:21

## 2025-07-01 RX ADMIN — IBUPROFEN 400 MG: 400 TABLET, FILM COATED ORAL at 09:26

## 2025-07-01 RX ADMIN — TRAZODONE HYDROCHLORIDE 50 MG: 50 TABLET ORAL at 01:03

## 2025-07-01 RX ADMIN — GUAIFENESIN AND DEXTROMETHORPHAN 5 ML: 100; 10 SYRUP ORAL at 22:20

## 2025-07-01 RX ADMIN — THERA TABS 1 TABLET: TAB at 09:25

## 2025-07-01 ASSESSMENT — PATIENT HEALTH QUESTIONNAIRE - PHQ9
2. FEELING DOWN, DEPRESSED OR HOPELESS: SEVERAL DAYS
SUM OF ALL RESPONSES TO PHQ QUESTIONS 1-9: 2
1. LITTLE INTEREST OR PLEASURE IN DOING THINGS: SEVERAL DAYS
SUM OF ALL RESPONSES TO PHQ QUESTIONS 1-9: 2
1. LITTLE INTEREST OR PLEASURE IN DOING THINGS: SEVERAL DAYS
SUM OF ALL RESPONSES TO PHQ QUESTIONS 1-9: 2
2. FEELING DOWN, DEPRESSED OR HOPELESS: SEVERAL DAYS

## 2025-07-01 ASSESSMENT — SLEEP AND FATIGUE QUESTIONNAIRES
DO YOU HAVE DIFFICULTY SLEEPING: NO
DO YOU HAVE DIFFICULTY SLEEPING: YES
DO YOU USE A SLEEP AID: NO
DO YOU USE A SLEEP AID: NO
AVERAGE NUMBER OF SLEEP HOURS: 6
SLEEP PATTERN: DIFFICULTY FALLING ASLEEP
AVERAGE NUMBER OF SLEEP HOURS: 7

## 2025-07-01 ASSESSMENT — PAIN DESCRIPTION - LOCATION: LOCATION: EYE

## 2025-07-01 ASSESSMENT — PAIN DESCRIPTION - ORIENTATION: ORIENTATION: RIGHT;LEFT

## 2025-07-01 ASSESSMENT — PAIN DESCRIPTION - DESCRIPTORS: DESCRIPTORS: THROBBING;STABBING

## 2025-07-01 ASSESSMENT — PAIN SCALES - WONG BAKER: WONGBAKER_NUMERICALRESPONSE: HURTS A LITTLE BIT

## 2025-07-01 ASSESSMENT — PAIN SCALES - GENERAL
PAINLEVEL_OUTOF10: 6
PAINLEVEL_OUTOF10: 4

## 2025-07-01 ASSESSMENT — PAIN - FUNCTIONAL ASSESSMENT: PAIN_FUNCTIONAL_ASSESSMENT: PREVENTS OR INTERFERES SOME ACTIVE ACTIVITIES AND ADLS

## 2025-07-01 NOTE — PROGRESS NOTES
Home Medication Reconciliation Status          [x] COMPLETE       Medication history has been reviewed and obtained from the following source(s):       [x] patient/family verbal report             [] patient/family provided written list       [] external pharmacy   [] external facility list         []  Provider notified that home medication reconciliation is complete          [] IN PROGRESS       Medication reconciliation marked in progress at this time due to:       [] patient/family poor historian      [] waiting arrival of family to clarify       [] waiting for accurate list        [] external pharmacy needs called      * Follow up is needed.          [] UNABLE TO ASSESS       Medication reconciliation is incomplete and unable to assess at this time due to:       [] critical patient condition   [] patient is unresponsive        [] no family available                       [] unknown pharmacy       [] anonymous patient          * Follow up is needed.      [] Pharmacy consult placed for medication reconciliation assistance   Additional comments: Ritesh reports that she is not currently prescribed any medications and is not taking any medications.

## 2025-07-01 NOTE — GROUP NOTE
Group Therapy Note    Date: 7/1/2025    Group Start Time: 1000  Group End Time: 1045  Group Topic: Cognitive Skills    [facility]    Emilee Hernández LISW        Group Therapy Note    Patient was invited to group but unable to attend.      Attendees: 7    Signature:  KRISTYN Bravo

## 2025-07-01 NOTE — PROGRESS NOTES
Home Medication Reconciliation Status          [x] COMPLETE       Medication history has been reviewed and obtained from the following source(s):       [] patient/family verbal report             [] patient/family provided written list       [] external pharmacy   [x] external facility list          []  Provider notified that home medication reconciliation is complete          [] IN PROGRESS       Medication reconciliation marked in progress at this time due to:       [] patient/family poor historian      [] waiting arrival of family to clarify       [] waiting for accurate list        [] external pharmacy needs called      * Follow up is needed.          [] UNABLE TO ASSESS       Medication reconciliation is incomplete and unable to assess at this time due to:       [] critical patient condition   [] patient is unresponsive        [] no family available                       [] unknown pharmacy       [] anonymous patient          * Follow up is needed.      [] Pharmacy consult placed for medication reconciliation assistance   Additional comments:  Verified via dispense report, did not speak to [employer], no known return call. Pt states she is not on any routine medications and only uses [facility] pharmacy. Meds have already been started today.

## 2025-07-01 NOTE — PROGRESS NOTES
Ritesh presented to [facility] ED with police and EMS after she ingested 6 grams of Tylenol in a suicide attempt. Ritesh posted to social media and called 911 after the ingestion. Ritesh reports that her 4 year old daughter was with her father.     Ritesh's Tylenol level was 30 mcg/mL in the ED, so ED staff at [facility] contacted poison control and administered charcoal.     Ritesh reports that she previously attempted suicide via medication overdose, but has never been admitted to an inpatient psychiatric unit. She states that she is not currently taking any medications and does not have any outpatient mental health resources.     Ritesh endorses auditory and visual hallucinations. When asked to describe them, Ritesh told this writer that it is difficult to explain what she hears and sees. She did clarify that the auditory hallucinations are not not commanding in nature.      Upon arrival to this unit, Ritesh is calm, pleasant, and cooperative with the admission process. She brightened quite a bit during 1:1 interaction. This writer observed Ritesh frequently itching her left, and when asked about it, she stated that she has dry skin. This writer provided Ritesh with lotion.    Ritesh denies SI, HI, and AVH.

## 2025-07-01 NOTE — PROGRESS NOTES
Behavioral Services  Medicare Certification Upon Admission    I certify that this patient's inpatient psychiatric hospital admission is medically necessary for:    [x] (1) Treatment which could reasonably be expected to improve this patient's condition,       [x] (2) Or for diagnostic study;     AND     [x](2) The inpatient psychiatric services are provided while the individual is under the care of a physician and are included in the individualized plan of care.    Estimated length of stay/service 2-3 days    Plan for post-hospital care incomplete    Electronically signed by RUSSELL FULLER MD on 7/1/2025 at 4:09 PM

## 2025-07-01 NOTE — PROGRESS NOTES
4 Eyes Skin Assessment     NAME:  Ritesh Giang  YOB: 2000  MEDICAL RECORD NUMBER:  [id number]    The patient is being assessed for  Admission    I agree that at least one RN has performed a thorough Head to Toe Skin Assessment on the patient. ALL assessment sites listed below have been assessed.      Areas assessed by both nurses:    Head, Face, Ears, Shoulders, Back, Chest, Arms, Elbows, Hands, Sacrum. Buttock, Coccyx, Ischium, and Legs. Feet and Heels        Does the Patient have a Wound? No noted wound(s)       Robin Prevention initiated by RN: No  Wound Care Orders initiated by RN: No    Pressure Injury (Stage 1,2,3,4, Unstageable, DTI, NWPT, and Complex wounds) if present, place Wound referral order by RN under : No    New Ostomies, if present place, Ostomy referral order under : No     Nurse 1 eSignature: Electronically signed by Socorro Olivia RN on 7/1/25 at 1:30 AM EDT    **SHARE this note so that the co-signing nurse can place an eSignature**    Nurse 2 eSignature: Electronically signed by Cira Larose RN on 7/1/25 at 4:47 AM EDT

## 2025-07-01 NOTE — PROGRESS NOTES
[facility]  Admission Note     Admission Type:   Admission Type: Involuntary    Reason for admission:  Reason for Admission: Suicide attempt via overdose on Tylenol      Addictive Behavior:   Addictive Behavior  In the Past 3 Months, Have You Felt or Has Someone Told You That You Have a Problem With  : None    Medical Problems:   History reviewed. No pertinent past medical history.    Status EXAM:  Mental Status and Behavioral Exam  Normal: No  Level of Assistance: Independent/Self  Facial Expression: Flat, Worried  Affect: Congruent  Level of Consciousness: Alert  Frequency of Checks: 4 times per hour, close  Mood:Normal: No  Mood: Depressed, Anxious  Motor Activity:Normal: Yes  Eye Contact: Good  Observed Behavior: Withdrawn, Cooperative, Friendly  Sexual Misconduct History: Current - no  Preception: [place] to person, [place] to time, [place] to place, [place] to situation  Attention:Normal: Yes  Thought Processes: Unremarkable  Thought Content:Normal: Yes  Depression Symptoms: Change in energy level, Feelings of helplessness, Feelings of hopelessess, Isolative, Loss of interest, Sleep disturbance  Anxiety Symptoms: Generalized  Melisa Symptoms: No problems reported or observed.  Hallucinations: Auditory (comment), Visual (comment) (Reports infrequent AVH that she has experienced for years. States AH are not commanding, but reports that she doesn't quite know how to describe them)  Delusions: No  Memory:Normal: Yes  Insight and Judgment: No  Insight and Judgment: Poor insight, Poor judgment    Tobacco Screening:  Practical Counseling, on admission, joe X, if applicable and completed (first 3 are required if patient doesn't refuse):            ( ) Recognizing danger situations (included triggers and roadblocks)                    ( ) Coping skills (new ways to manage stress,relaxation techniques, changing routine, distraction)                                                           ( ) Basic

## 2025-07-01 NOTE — CARE COORDINATION
SW completed psychosocial assessment, CSSR-S, leisure assessment and OQ Analyst with patient. Patient was friendly and cooperative in answering questions.     During the assessment, SW:   Verified discharging address: [x]: [facility]  Verified current services: Medication provider none, Therapy none. Discussed referral to [facility] for mental health. Patient was agreeable to the referral.  Discussed potential discharge plan:Mental Health and PCP, in-person  Insurance: Other no insurance   Pam Segovia, [facility]     07/01/25 8903   Psychiatric History   Psychiatric history treatment Other  (no current treatment. no previous hospitalizations)   Are there any medication issues? No   Recent Psychological Experiences Other(comment)  (pt states \"i don't really know, one of my friends was worried about me because I am always sad\" pt stated that she took tylenol while drinking but denied it was a suicide attempt and said \"it just looked that way\")   Support System   Support system Adequate   Types of Support System Mother;Father;Friend   Problems in support system None   Current Living Situation   Home Living Adequate  (pt lives in a trailer)   Living information Lives alone   Problems with living situation  No   Lack of basic needs Yes   Lacking basic needs Utilities   SSDI/SSI none   Other government assistance noen   Problems with environment none   Current abuse issues none   Supervised setting None   Relationship problems No   Medical and Self-Care Issues   Relevant medical problems none   Relevant self-care issues none   Barriers to treatment No   Family Constellation   Spouse/partner-name/age single   Children-names/ages 4 year old child   Parents mom-zfinn, step-dad: maria teresa   Siblings 6 siblings   Support services   (none)   Childhood   Raised by Biological mother;Other   Biological mother zula   Other aunt   Relevant family history pt states the family has many mental illnesses in the family but did not disclose any details

## 2025-07-01 NOTE — GROUP NOTE
Group Therapy Note    Date: 7/1/2025    Group Start Time: 1100  Group End Time: 1145  Group Topic: Psychoeducation    [facility] [place] LSW        Group Therapy Note  Patient was invited to group but unable to attend.     Attendees: 5     Signature:  JUDE Hernandez

## 2025-07-01 NOTE — PROGRESS NOTES
Left a message with [facility] pharmacy at [phone], to return call, to complete med reconciliation.

## 2025-07-01 NOTE — PROGRESS NOTES
CSSR-S Assessment completed with patient who then scored HIGH RISK.     Provider Liyah Flores, APRN-CNP, was notified of HIGH RISK score, via Telephone at 0110.      Were suicide precautions ordered: NO    If not ordered, justification as follows: Ritesh denies current suicidal ideation, plan, and intent. She reports that she feels that she can demonstrate safe behavior while on this unit and is willing to reach out to staff if she believes that she can no longer maintain safe behavior at any point during this admission.     Completed by: Socorro VERGARA RN

## 2025-07-01 NOTE — ED NOTES
Transfer center called and states pt's bed assignment is 2321-1, pt [facility] at 11:30pm. Pt updated. Lynn Haider also called and updated on transport time.

## 2025-07-01 NOTE — PROGRESS NOTES
Ritesh arrived on this unit from [facility] with two medical transporters and one security staff from this facility at 00:40. Ritesh is ambulatory with a steady gait upon arrival. Security staff escorted her through the security checkpoint before she entered this unit. A metal detector wand was utilized to detect contraband. Ritesh is alert and oriented x4, calm, and cooperative. She denies current suicidal and homicidal ideation as well as current auditory, visual, and tactile hallucinations. Staff provided Ritesh with a snack and a beverage and this writer oriented her to this unit and her room. Ritesh is agreeable to participate in the admission process.

## 2025-07-01 NOTE — ED NOTES
Transfer Center Handoff for Behavioral Health Transfers      Patient's Current Location: [facility] EMERGENCY DEPARTMENT     Chief Complaint   Patient presents with    Mental Health Problem       Current or History of Violent Behavior: No    Currently in Restraints Now or During this Encounter: No  (Specify if Agitation or self harm is noted in ED?)  If yes, please describe behaviors requiring restraint:             Medical Clearance Documented and Verified in the Chart: Yes    Allergies   Allergen Reactions    Penicillins Anaphylaxis    Amoxicillin Hives        Can Patient Tolerate Lying Flat: Yes    Able to Perform ADLs:  Yes  (Specify if able to ambulate or uses any mobility devices such as cane or walker)  Activity: To bathroom  Level of Assistance:    Assistive Device:    Miscellaneous Devices:      LABS    CBC:   Lab Results   Component Value Date/Time    WBC 11.4 06/30/2025 04:09 PM    RBC 4.30 06/30/2025 04:09 PM    HGB 12.8 06/30/2025 04:09 PM    HCT 39.0 06/30/2025 04:09 PM    MCV 90.7 06/30/2025 04:09 PM    MCH 29.8 06/30/2025 04:09 PM    MCHC 32.8 06/30/2025 04:09 PM    RDW 12.5 06/30/2025 04:09 PM     06/30/2025 04:09 PM    MPV 9.4 06/30/2025 04:09 PM     CMP:   Lab Results   Component Value Date/Time     06/30/2025 04:09 PM    K 3.9 06/30/2025 04:09 PM     06/30/2025 04:09 PM    CO2 16 06/30/2025 04:09 PM    BUN 8 06/30/2025 04:09 PM    CREATININE 0.7 06/30/2025 04:09 PM    LABGLOM >90 06/30/2025 04:09 PM    GLUCOSE 137 06/30/2025 04:09 PM    CALCIUM 9.2 06/30/2025 04:09 PM    BILITOT 0.3 06/30/2025 04:09 PM    ALKPHOS 94 06/30/2025 04:09 PM    AST 24 06/30/2025 04:09 PM    ALT 19 06/30/2025 04:09 PM     Drug Panel:   Lab Results   Component Value Date/Time    OPIAU NEGATIVE 06/30/2025 04:09 PM     UA:  Lab Results   Component Value Date/Time    COLORU Yellow 08/31/2023 04:37 PM    PROTEINU Negative 08/31/2023 04:37 PM    GLUCOSEU Negative 08/31/2023 04:37 PM    KETUA

## 2025-07-01 NOTE — PLAN OF CARE
Problem: Anxiety  Goal: Will report anxiety at manageable levels  Description: INTERVENTIONS:  1. Administer medication as ordered  2. Teach and rehearse alternative coping skills  3. Provide emotional support with 1:1 interaction with staff  7/1/2025 0943 by Kianna Perez RN  Outcome: Progressing  7/1/2025 0122 by Socorro Olivia RN  Outcome: Progressing     Problem: Depression/Self Harm  Goal: Effect of psychiatric condition will be minimized and patient will be protected from self harm  Description: INTERVENTIONS:  1. Assess impact of patient's symptoms on level of functioning, self care needs and offer support as indicated  2. Assess patient/family knowledge of depression, impact on illness and need for teaching  3. Provide emotional support, presence and reassurance  4. Assess for possible suicidal thoughts or ideation. If patient expresses suicidal thoughts or statements do not leave alone, initiate Suicide Precautions, move to a room close to the nursing station and obtain sitter  5. Initiate consults as appropriate with Mental Health Professional, Spiritual Care, Psychosocial CNS, and consider a recommendation to the LIP for a Psychiatric Consultation  7/1/2025 0943 by Kianna Perez RN  Outcome: Progressing  7/1/2025 0122 by Socorro Olivia RN  Outcome: Progressing     Problem: Risk for Elopement  Goal: Patient will not exit the unit/facility without proper excort  Outcome: Progressing

## 2025-07-01 NOTE — ED NOTES
Report given to [facility]. Transport provided with pt's necessary paperwork and pt's belongings. Pt left on stretcher in no signs of acute distress.

## 2025-07-01 NOTE — H&P
Hospital Medicine History & Physical      PCP: No primary care provider on file.    Date of Admission: 2025    Date of Service: Pt seen/examined on 2025     Chief Complaint:  No chief complaint on file.        History Of Present Illness:      The patient is a 24 y.o. female with no significant pmhx who presented to [facility] for intentional drug overdose with 12 tylenol.  Patient was seen and evaluated in the ED by the ED medical provider, patient was medically cleared for admission to [facility].  This note serves as an admission medical H&P.    Tobacco use: 0.25 ppd   ETOH use: 2 times weekly   Illicit drug use: Marijuana     Patient denies any medical complaints     Past Medical History:    History reviewed. No pertinent past medical history.    Past Surgical History:        Procedure Laterality Date    THERAPEUTIC          Medications Prior to Admission:    Prior to Admission medications    Not on File       Allergies:  Penicillins and Amoxicillin    Social History:      TOBACCO:   reports that she has been smoking cigarettes. She has never used smokeless tobacco.  ETOH:   reports current alcohol use.      Family History:   Positive as follows:    History reviewed. No pertinent family history.    REVIEW OF SYSTEMS:       Constitutional: Negative for fever   HENT: Negative for sore throat   Eyes: Negative for redness   Respiratory: Negative  for dyspnea, cough   Cardiovascular: Negative for chest pain   Gastrointestinal: Negative for vomiting, diarrhea   Genitourinary: Negative for hematuria   Musculoskeletal: Negative for arthralgias   Skin: Negative for rash   Neurological: Negative for syncope    Hematological: Negative for easy bruising/bleeding   Psychiatric/Behavorial: Per psychiatry team evaluation     PHYSICAL EXAM:    /66   Pulse 88   Temp 97.2 °F (36.2 °C) (Temporal)   Resp 16   Ht 1.575 m (5' 2\")   Wt 61.7 kg (136 lb)   LMP 2025   SpO2 97%   Breastfeeding No  
depression and anxiety seem most accurate to her.  No previous suicide attempts which contradicts the ED telepsych note. She says she was on medicines at 15, but took them for a short period and does not remember their names.    SUBSTANCE USE HISTORY:  She states she drinks alcohol 2 or 3 times a week, usually 3 to 4 twisted teas at a time.  She does not drink daily.  She has never been through alcohol withdrawal.  Rare cannabis.  No other substances.  No treatment programs.    PAST MEDICAL HISTORY:  No chronic conditions, traumatic brain injuries, seizures, or major surgeries.    FAMILY PSYCHIATRIC HISTORY:  Maternal side; borderline personality disorder, depression, bipolar disorder, and substance use.  No suicides.    MEDICATIONS:  None.    ALLERGIES:  PENICILLIN AND AMOXICILLIN.    SOCIAL HISTORY:  Born in [place].  Six siblings.  Parents never .  She was raised by aunt and later her mother.  Growing up was \"I guess good.\"  She graduated high school and has worked off and on ever since.  Never .  She has a 4-year-old daughter with whom she shares custody with her daughter's father, but otherwise lives by herself.  She works nearly part-time painting houses.    LEGAL HISTORY:  None.    REVIEW OF SYSTEMS:  Cough, chronic headaches.  She does not describe or endorse recent changes in vision, chest pain, shortness of breath, sore throat, fevers, abdominal pain, bleeding problems, or skin problems.  She moves and speaks without difficulty.    MENTAL STATUS EXAMINATION:  She presented in hospital gown.  She was guarded, but open with conversation.  She made fair eye contact.  She described her mood as \"depressed\" and had a congruent affect.  She had mild psychomotor retardation.    She spoke in a low volume.  She was not pressured.  She was oriented to the day, date, place in the context of this evaluation.  Her memory was intact.    Her use of language, speech, and educational attainment suggested an

## 2025-07-01 NOTE — PROGRESS NOTES
A/O x4. Is friendly and cooperative. Has stayed isolated in her room, is somewhat withdrawn. Encouraged her to participate in activities, groups. She is agreeable to staying and signed her paperwork. Appetite has been adequate. Is able to voice wants and needs. Denies SI/HI, denies AVH. States she uses alcohol every other week, when she does not have her child. Took her medication today, medication education provided, she verbalizes an understanding.  Did also take Ibuprofen PRN for a headache, says it is \"normal\" for her to have a headache. Oriented her to the unit, as far as groups and meals. Plan of care is ongoing.

## 2025-07-02 VITALS
BODY MASS INDEX: 25.03 KG/M2 | OXYGEN SATURATION: 99 % | RESPIRATION RATE: 16 BRPM | TEMPERATURE: 98.2 F | DIASTOLIC BLOOD PRESSURE: 76 MMHG | HEIGHT: 62 IN | WEIGHT: 136 LBS | HEART RATE: 102 BPM | SYSTOLIC BLOOD PRESSURE: 106 MMHG

## 2025-07-02 LAB
CHOLEST SERPL-MCNC: 115 MG/DL (ref 0–199)
EST. AVERAGE GLUCOSE BLD GHB EST-MCNC: 102.5 MG/DL
HBA1C MFR BLD: 5.2 %
HDLC SERPL-MCNC: 38 MG/DL (ref 40–60)
LDLC SERPL CALC-MCNC: 59 MG/DL
TRIGL SERPL-MCNC: 90 MG/DL (ref 0–150)
VLDLC SERPL CALC-MCNC: 18 MG/DL

## 2025-07-02 PROCEDURE — 6370000000 HC RX 637 (ALT 250 FOR IP): Performed by: PSYCHIATRY & NEUROLOGY

## 2025-07-02 PROCEDURE — 5130000000 HC BRIDGE APPOINTMENT

## 2025-07-02 PROCEDURE — 6370000000 HC RX 637 (ALT 250 FOR IP)

## 2025-07-02 RX ORDER — ESCITALOPRAM OXALATE 10 MG/1
10 TABLET ORAL DAILY
Qty: 30 TABLET | Refills: 0 | Status: SHIPPED | OUTPATIENT
Start: 2025-07-02

## 2025-07-02 RX ADMIN — ACETAMINOPHEN 650 MG: 325 TABLET ORAL at 11:23

## 2025-07-02 RX ADMIN — THERA TABS 1 TABLET: TAB at 12:51

## 2025-07-02 RX ADMIN — ESCITALOPRAM OXALATE 10 MG: 10 TABLET ORAL at 12:50

## 2025-07-02 RX ADMIN — Medication 100 MG: at 12:50

## 2025-07-02 ASSESSMENT — PAIN SCALES - GENERAL: PAINLEVEL_OUTOF10: 4

## 2025-07-02 ASSESSMENT — PAIN DESCRIPTION - LOCATION: LOCATION: HEAD

## 2025-07-02 ASSESSMENT — PAIN DESCRIPTION - ORIENTATION: ORIENTATION: INNER

## 2025-07-02 ASSESSMENT — PAIN - FUNCTIONAL ASSESSMENT: PAIN_FUNCTIONAL_ASSESSMENT: ACTIVITIES ARE NOT PREVENTED

## 2025-07-02 ASSESSMENT — PAIN DESCRIPTION - DESCRIPTORS: DESCRIPTORS: ACHING

## 2025-07-02 NOTE — PLAN OF CARE
Problem: Anxiety  Goal: Will report anxiety at manageable levels  Description: INTERVENTIONS:  1. Administer medication as ordered  2. Teach and rehearse alternative coping skills  3. Provide emotional support with 1:1 interaction with staff  Outcome: Progressing  Flowsheets (Taken 7/1/2025 1404 by Kianna Perez, RN)  Will report anxiety at manageable levels:   Administer medication as ordered   Teach and rehearse alternative coping skills     Problem: Depression/Self Harm  Goal: Effect of psychiatric condition will be minimized and patient will be protected from self harm  Description: INTERVENTIONS:  1. Assess impact of patient's symptoms on level of functioning, self care needs and offer support as indicated  2. Assess patient/family knowledge of depression, impact on illness and need for teaching  3. Provide emotional support, presence and reassurance  4. Assess for possible suicidal thoughts or ideation. If patient expresses suicidal thoughts or statements do not leave alone, initiate Suicide Precautions, move to a room close to the nursing station and obtain sitter  5. Initiate consults as appropriate with Mental Health Professional, Spiritual Care, Psychosocial CNS, and consider a recommendation to the LIP for a Psychiatric Consultation  Outcome: Progressing     Problem: Risk for Elopement  Goal: Patient will not exit the unit/facility without proper excort  Outcome: Progressing   Patient was mostly regressed to her room & bed & has been noted to be reading. Patient was out to get a snack & used the phone. Patient was verbal in 1:1 & reported that she came here due to taking 2,000 mg of tylenol & drank 6 small bottles of twisted tea. Patient denied wanted to kill herself & reported having a bad headache, prior to taking the tylenol. Patient reported that her stressors included having financial issues. Patient denied feelings of self harm. Patient hopes to be discharged later today.  Patient with no

## 2025-07-02 NOTE — BH NOTE
Bridge Appointment completed: Reviewed Discharge Instructions with patient.    Patient verbalizes understanding and agreement with the discharge plan using the teachback method.     Referral for Outpatient Tobacco Cessation Counseling, upon discharge (joe X if applicable and completed):    ( )  Hospital staff assisted patient to call Quit Line or faxed referral                                   during hospitalization                  ( )  Recognizing danger situations (included triggers and roadblocks), if not completed on admission                    ( )  Coping skills (new ways to manage stress, exercise, relaxation techniques, changing routine, distraction), if not completed on admission                                                           ( )  Basic information about quitting (benefits of quitting, techniques in how to quit, available resources, if not completed on admission  ( ) [facility]   ( ) Patient refused referral  (X ) Patient refused counseling  (X ) Patient refused smoking cessation medication upon discharge    Vaccinations (joe X if applicable and completed):  ( ) Patient states already received influenza vaccine elsewhere  ( ) Patient received influenza vaccine during this hospitalization  ( ) Patient refused influenza vaccine at this time  ( X) Not offered

## 2025-07-02 NOTE — TRANSITION OF CARE
Behavioral Health Transition Record    Patient Name: Ritesh Giang  YOB: 2000   Medical Record Number: [id number]  Date of Admission: 7/1/2025 12:44 AM   Date of Discharge: 7/2/2025    Attending Provider: Herve Gleason MD   Discharging Provider: Herve Gleason MD  To contact this individual call [phone] and ask the  to page.  If unavailable, ask to be transferred to Behavioral Health Provider on call.  A Behavioral Health Provider will be available on call 24/7 and during holidays.    Primary Care Provider: No primary care provider on file.    Allergies   Allergen Reactions    Penicillins Anaphylaxis    Amoxicillin Hives       Reason for Admission: Ritesh is a domiciled, never , and partially employed 24-year-old with a remote history of multiple psychiatric diagnoses, whose friend called 911 after the patient took a small overdose of Tylenol.     SOURCES OF INFORMATION:  Patient.  Focused record review.     CHIEF COMPLAINT:  \"I was drinking and I took some Tylenol.\"     HISTORY OF PRESENT ILLNESS: The patient reports struggling with depression over the last couple of weeks.  She endorses low mood, anhedonia, fatigue, decreased motivation, trouble concentrating, tearfulness, insomnia (hypersomnia in the day), self-reproach and feelings of excessive guilt.  When asked about suicidal ideation, said \"not really, yesterday was just a bad day.\"     She says that yesterday she was drinking and took about 12 tablets of Tylenol.  Evidently, she expressed suicidal ideation online to a friend who then called 911.     She presents depressed, but feels safe here.     PSYCHIATRIC REVIEW OF SYSTEMS:  No luc or psychosis.     STRESSORS:  \"Life is hard\" especially finances and living alone.  She has lived alone for many years, although does share custody of her 4-year-old daughter who was not at home with the patient when she was drinking and overdosed on Tylenol.    Admission

## 2025-07-02 NOTE — PROGRESS NOTES
Patient complained of an occasional cough, which patient reported that she has had for a week & it increases when she sleeps.Patient stated that it caused her to throw up after dinner. \"I sometimes cough up green stuff.\" Dr. Gleason was notified, with reese ordered prn. Radha Moody R.N.

## 2025-07-02 NOTE — PLAN OF CARE
Problem: Self Harm/Suicidality  Goal: Will have no self-injury during hospital stay  Description: INTERVENTIONS:  1.  Ensure constant observer at bedside with Q15M safety checks  2.  Maintain a safe environment  3.  Secure patient belongings  4.  Ensure family/visitors adhere to safety recommendations  5.  Ensure safety tray has been added to patient's diet order  6.  Every shift and PRN: Re-assess suicidal risk via Frequent Screener    Recent Flowsheet Documentation  Taken 7/2/2025 1329 by Sophia Patel, RN  Will have no self-injury during hospital stay: Maintain a safe environment     Problem: Anxiety  Goal: Will report anxiety at manageable levels  Description: INTERVENTIONS:  1. Administer medication as ordered  2. Teach and rehearse alternative coping skills  3. Provide emotional support with 1:1 interaction with staff  7/2/2025 1334 by Sophia Patel, RN  Outcome: Progressing  Flowsheets (Taken 7/2/2025 1329)  Will report anxiety at manageable levels:   Administer medication as ordered   Teach and rehearse alternative coping skills   Provide emotional support with 1:1 interaction with staff  7/2/2025 0057 by Radha Moody, RN  Outcome: Progressing  Flowsheets (Taken 7/1/2025 1404 by Kianna Perez, RN)  Will report anxiety at manageable levels:   Administer medication as ordered   Teach and rehearse alternative coping skills     Problem: Coping  Goal: Pt/Family able to verbalize concerns and demonstrate effective coping strategies  Description: INTERVENTIONS:  1. Assist patient/family to identify coping skills, available support systems and cultural and spiritual values  2. Provide emotional support, including active listening and acknowledgement of concerns of patient and caregivers  3. Reduce environmental stimuli, as able  4. Instruct patient/family in relaxation techniques, as appropriate  5. Assess for spiritual pain/suffering and initiate Spiritual Care, Psychosocial Clinical Specialist consults as

## 2025-07-02 NOTE — GROUP NOTE
Group Therapy Note    Date: 7/2/2025    Group Start Time: 1115  Group End Time: 1200  Group Topic: Psychoeducation    [facility] [place] LSW        Group Therapy Note  Clinician educated patients on how to create more effective and achievable goals. Clinician educated patients on the SMART acronym and went through an example demonstrating how to create SMART goals. Clinician provided patients with a handout to create their own SMART goals and a plan to follow through on their goals.     Attendees: 5       Notes:  Patient was fully attentive and participated in the activity. Patient was able to create a SMART goal and make a plan on how they are going use them.    Status After Intervention:  Improved    Participation Level: Active Listener and Interactive    Participation Quality: Appropriate, Attentive, Sharing, and Supportive      Speech:  normal      Thought Process/Content: Logical  Linear      Affective Functioning: Congruent      Mood: euthymic      Level of consciousness:  Alert and Attentive      Response to Learning: Able to verbalize current knowledge/experience and Able to verbalize/acknowledge new learning      Endings: None Reported    Modes of Intervention: Education and Problem-solving      Discipline Responsible: /Counselor      Signature:  JUDE Hernandez

## 2025-07-02 NOTE — BH NOTE
[facility]  Discharge Note    Pt discharged with followings belongings:   Dental Appliances: None  Vision - Corrective Lenses: None  Hearing Aid: None  Jewelry: Body Piercing, Earrings, Necklace (all at the safe)  Body Piercings Removed: No  Clothing: Belt, Shorts, Undergarments  Other Valuables: Other (Comment) (n/a)   Valuables sent home with patient. Patient educated on aftercare instructions: YES .Patient verbalize understanding of AVS:  YES.    Status EXAM upon discharge:  Mental Status and Behavioral Exam  Normal: No  Level of Assistance: Independent/Self  Facial Expression: Brightened  Affect: Congruent  Level of Consciousness: Alert  Frequency of Checks: 4 times per hour, close  Mood:Normal: No  Mood: Depressed  Motor Activity:Normal: No  Motor Activity: Decreased  Eye Contact: Good  Observed Behavior: Cooperative  Sexual Misconduct History: Current - no  Preception: Coleman to person, Coleman to time, Coleman to place, Coleman to situation  Attention:Normal: Yes  Thought Processes: Unremarkable  Thought Content:Normal: Yes  Thought Content: Other (comment) (WNL)  Depression Symptoms: Sleep disturbance  Anxiety Symptoms: No problems reported or observed.  Melisa Symptoms: No problems reported or observed.  Hallucinations: None  Delusions: No  Memory:Normal: Yes  Insight and Judgment: No  Insight and Judgment: Poor judgment, Poor insight    Tobacco Screening:  Practical Counseling, on admission, joe X, if applicable and completed (first 3 are required if patient doesn't refuse)REFUSED:            ( ) Recognizing danger situations (included triggers and roadblocks)                    ( ) Coping skills (new ways to manage stress,relaxation techniques, changing routine, distraction)                                                           ( ) Basic information about quitting (benefits of quitting, techniques in how to quit, available resources  ( ) Referral for counseling faxed to Tobacco Treatment

## 2025-07-07 ENCOUNTER — FOLLOWUP TELEPHONE ENCOUNTER (OUTPATIENT)
Dept: PSYCHIATRY | Age: 25
End: 2025-07-07